# Patient Record
Sex: FEMALE | Race: WHITE | NOT HISPANIC OR LATINO | Employment: OTHER | URBAN - METROPOLITAN AREA
[De-identification: names, ages, dates, MRNs, and addresses within clinical notes are randomized per-mention and may not be internally consistent; named-entity substitution may affect disease eponyms.]

---

## 2023-05-29 PROBLEM — I10 HYPERTENSION: Status: ACTIVE | Noted: 2023-05-29

## 2023-05-29 PROBLEM — F41.9 ANXIETY: Status: ACTIVE | Noted: 2023-05-29

## 2023-05-29 PROBLEM — M47.816 LUMBAR SPONDYLOSIS: Status: ACTIVE | Noted: 2023-05-29

## 2023-05-29 PROBLEM — Z00.00 ROUTINE ADULT HEALTH MAINTENANCE: Status: ACTIVE | Noted: 2023-05-29

## 2023-05-29 PROBLEM — K57.30 DIVERTICULOSIS OF COLON: Status: ACTIVE | Noted: 2023-05-29

## 2023-05-29 PROBLEM — R03.0 ELEVATED BLOOD PRESSURE READING WITHOUT DIAGNOSIS OF HYPERTENSION: Status: ACTIVE | Noted: 2023-05-29

## 2023-05-29 PROBLEM — E55.9 VITAMIN D DEFICIENCY: Status: ACTIVE | Noted: 2018-07-17

## 2023-05-29 PROBLEM — M50.30 DEGENERATION OF INTERVERTEBRAL DISC OF CERVICAL REGION: Status: ACTIVE | Noted: 2023-05-29

## 2023-05-29 PROBLEM — R32 URINARY INCONTINENCE IN FEMALE: Status: ACTIVE | Noted: 2023-05-29

## 2023-05-29 PROBLEM — M19.90 ARTHRITIS: Status: ACTIVE | Noted: 2023-05-29

## 2023-05-29 PROBLEM — E78.2 HYPERLIPIDEMIA, MIXED: Status: ACTIVE | Noted: 2023-05-29

## 2023-05-29 PROBLEM — I49.3 MULTIPLE PREMATURE VENTRICULAR COMPLEXES: Status: ACTIVE | Noted: 2023-05-29

## 2023-05-29 PROBLEM — F32.0 CURRENT MILD EPISODE OF MAJOR DEPRESSIVE DISORDER WITHOUT PRIOR EPISODE (CMS-HCC): Status: ACTIVE | Noted: 2023-05-29

## 2023-05-29 PROBLEM — L50.9 URTICARIA: Status: ACTIVE | Noted: 2023-05-29

## 2023-05-29 PROBLEM — R00.2 PALPITATIONS: Status: ACTIVE | Noted: 2023-05-29

## 2023-05-29 PROBLEM — M47.22 CERVICAL SPONDYLOSIS WITH RADICULOPATHY: Status: ACTIVE | Noted: 2023-05-29

## 2023-06-22 ENCOUNTER — APPOINTMENT (OUTPATIENT)
Dept: PRIMARY CARE | Facility: CLINIC | Age: 63
End: 2023-06-22
Payer: COMMERCIAL

## 2023-06-22 RX ORDER — SERTRALINE HYDROCHLORIDE 50 MG/1
50 TABLET, FILM COATED ORAL DAILY
COMMUNITY
Start: 2020-02-11 | End: 2023-08-21 | Stop reason: SDUPTHER

## 2023-06-22 RX ORDER — BENZONATATE 200 MG/1
200 CAPSULE ORAL 3 TIMES DAILY PRN
COMMUNITY
Start: 2022-12-16 | End: 2023-12-28 | Stop reason: ALTCHOICE

## 2023-06-22 RX ORDER — CHOLECALCIFEROL (VITAMIN D3) 125 MCG
1 CAPSULE ORAL DAILY
COMMUNITY
Start: 2020-04-09

## 2023-06-22 RX ORDER — EVENING PRIMROSE OIL 500 MG
1 CAPSULE ORAL DAILY
COMMUNITY
Start: 2020-07-20

## 2023-06-22 RX ORDER — LANOLIN ALCOHOL/MO/W.PET/CERES
1 CREAM (GRAM) TOPICAL DAILY
COMMUNITY
Start: 2020-07-20

## 2023-06-22 RX ORDER — NAPROXEN SODIUM 550 MG/1
550 TABLET ORAL EVERY 12 HOURS PRN
COMMUNITY
Start: 2016-07-08 | End: 2023-12-28 | Stop reason: ALTCHOICE

## 2023-06-22 RX ORDER — FLUOCINONIDE 0.5 MG/G
1 CREAM TOPICAL 2 TIMES DAILY
COMMUNITY
Start: 2022-10-21 | End: 2023-12-28 | Stop reason: ALTCHOICE

## 2023-06-22 RX ORDER — AMLODIPINE BESYLATE 2.5 MG/1
1 TABLET ORAL DAILY
COMMUNITY
Start: 2022-07-15 | End: 2023-12-28 | Stop reason: ALTCHOICE

## 2023-08-04 ENCOUNTER — APPOINTMENT (OUTPATIENT)
Dept: PRIMARY CARE | Facility: CLINIC | Age: 63
End: 2023-08-04
Payer: COMMERCIAL

## 2023-08-21 DIAGNOSIS — F41.9 ANXIETY: ICD-10-CM

## 2023-08-21 RX ORDER — SERTRALINE HYDROCHLORIDE 50 MG/1
50 TABLET, FILM COATED ORAL DAILY
Qty: 90 TABLET | Refills: 3 | Status: SHIPPED | OUTPATIENT
Start: 2023-08-21

## 2023-12-28 ENCOUNTER — OFFICE VISIT (OUTPATIENT)
Dept: PRIMARY CARE | Facility: CLINIC | Age: 63
End: 2023-12-28
Payer: MEDICARE

## 2023-12-28 ENCOUNTER — TELEPHONE (OUTPATIENT)
Dept: PRIMARY CARE | Facility: CLINIC | Age: 63
End: 2023-12-28

## 2023-12-28 VITALS
HEART RATE: 82 BPM | BODY MASS INDEX: 25.71 KG/M2 | OXYGEN SATURATION: 96 % | HEIGHT: 66 IN | DIASTOLIC BLOOD PRESSURE: 67 MMHG | WEIGHT: 160 LBS | TEMPERATURE: 97.9 F | SYSTOLIC BLOOD PRESSURE: 104 MMHG

## 2023-12-28 DIAGNOSIS — J32.9 SINUSITIS, UNSPECIFIED CHRONICITY, UNSPECIFIED LOCATION: Primary | ICD-10-CM

## 2023-12-28 DIAGNOSIS — F41.9 ANXIETY: ICD-10-CM

## 2023-12-28 PROBLEM — T63.441A BEE STING REACTION: Status: ACTIVE | Noted: 2023-12-28

## 2023-12-28 PROBLEM — R93.89 ULTRASOUND SCAN ABNORMAL: Status: ACTIVE | Noted: 2023-12-28

## 2023-12-28 PROBLEM — J06.9 UPPER RESPIRATORY TRACT INFECTION: Status: RESOLVED | Noted: 2023-12-28 | Resolved: 2023-12-28

## 2023-12-28 PROBLEM — R51.9 HEADACHE: Status: ACTIVE | Noted: 2023-12-28

## 2023-12-28 PROBLEM — R26.2 DIFFICULTY WALKING: Status: ACTIVE | Noted: 2023-12-28

## 2023-12-28 PROBLEM — R10.9 LEFT FLANK PAIN: Status: ACTIVE | Noted: 2023-12-28

## 2023-12-28 PROBLEM — T63.441A BEE STING REACTION: Status: RESOLVED | Noted: 2023-12-28 | Resolved: 2023-12-28

## 2023-12-28 PROBLEM — R11.0 NAUSEA: Status: ACTIVE | Noted: 2023-12-28

## 2023-12-28 PROBLEM — R07.89 ATYPICAL CHEST PAIN: Status: ACTIVE | Noted: 2023-12-28

## 2023-12-28 PROBLEM — R09.89 RESPIRATORY SYMPTOMS: Status: RESOLVED | Noted: 2023-12-28 | Resolved: 2023-12-28

## 2023-12-28 PROBLEM — R09.89 RESPIRATORY SYMPTOMS: Status: ACTIVE | Noted: 2023-12-28

## 2023-12-28 PROBLEM — M54.9 COSTOVERTEBRAL ANGLE TENDERNESS: Status: ACTIVE | Noted: 2023-12-28

## 2023-12-28 PROBLEM — R07.81 RIB TENDERNESS: Status: ACTIVE | Noted: 2023-12-28

## 2023-12-28 PROBLEM — M54.6 THORACIC SPINE PAIN: Status: ACTIVE | Noted: 2023-12-28

## 2023-12-28 PROBLEM — J06.9 UPPER RESPIRATORY TRACT INFECTION: Status: ACTIVE | Noted: 2023-12-28

## 2023-12-28 LAB — POC RAPID STREP: NEGATIVE

## 2023-12-28 PROCEDURE — 1159F MED LIST DOCD IN RCRD: CPT | Performed by: NURSE PRACTITIONER

## 2023-12-28 PROCEDURE — 3078F DIAST BP <80 MM HG: CPT | Performed by: NURSE PRACTITIONER

## 2023-12-28 PROCEDURE — 87880 STREP A ASSAY W/OPTIC: CPT | Performed by: NURSE PRACTITIONER

## 2023-12-28 PROCEDURE — 1160F RVW MEDS BY RX/DR IN RCRD: CPT | Performed by: NURSE PRACTITIONER

## 2023-12-28 PROCEDURE — 1036F TOBACCO NON-USER: CPT | Performed by: NURSE PRACTITIONER

## 2023-12-28 PROCEDURE — 3074F SYST BP LT 130 MM HG: CPT | Performed by: NURSE PRACTITIONER

## 2023-12-28 PROCEDURE — 99213 OFFICE O/P EST LOW 20 MIN: CPT | Performed by: NURSE PRACTITIONER

## 2023-12-28 RX ORDER — ALBUTEROL SULFATE 90 UG/1
2 AEROSOL, METERED RESPIRATORY (INHALATION) EVERY 4 HOURS PRN
Qty: 18 G | Refills: 2 | Status: SHIPPED | OUTPATIENT
Start: 2023-12-28 | End: 2024-12-27

## 2023-12-28 RX ORDER — BENZONATATE 200 MG/1
200 CAPSULE ORAL 3 TIMES DAILY PRN
Qty: 30 CAPSULE | Refills: 0 | Status: SHIPPED | OUTPATIENT
Start: 2023-12-28 | End: 2024-01-08 | Stop reason: WASHOUT

## 2023-12-28 RX ORDER — AMOXICILLIN AND CLAVULANATE POTASSIUM 875; 125 MG/1; MG/1
875 TABLET, FILM COATED ORAL 2 TIMES DAILY
Qty: 14 TABLET | Refills: 0 | Status: SHIPPED | OUTPATIENT
Start: 2023-12-28 | End: 2024-01-04

## 2023-12-28 ASSESSMENT — PATIENT HEALTH QUESTIONNAIRE - PHQ9
SUM OF ALL RESPONSES TO PHQ QUESTIONS 1-9: 8
9. THOUGHTS THAT YOU WOULD BE BETTER OFF DEAD, OR OF HURTING YOURSELF: NOT AT ALL
10. IF YOU CHECKED OFF ANY PROBLEMS, HOW DIFFICULT HAVE THESE PROBLEMS MADE IT FOR YOU TO DO YOUR WORK, TAKE CARE OF THINGS AT HOME, OR GET ALONG WITH OTHER PEOPLE: SOMEWHAT DIFFICULT
4. FEELING TIRED OR HAVING LITTLE ENERGY: NEARLY EVERY DAY
1. LITTLE INTEREST OR PLEASURE IN DOING THINGS: SEVERAL DAYS
3. TROUBLE FALLING OR STAYING ASLEEP OR SLEEPING TOO MUCH: SEVERAL DAYS
2. FEELING DOWN, DEPRESSED OR HOPELESS: NEARLY EVERY DAY
7. TROUBLE CONCENTRATING ON THINGS, SUCH AS READING THE NEWSPAPER OR WATCHING TELEVISION: NOT AT ALL
6. FEELING BAD ABOUT YOURSELF - OR THAT YOU ARE A FAILURE OR HAVE LET YOURSELF OR YOUR FAMILY DOWN: NOT AT ALL
8. MOVING OR SPEAKING SO SLOWLY THAT OTHER PEOPLE COULD HAVE NOTICED. OR THE OPPOSITE, BEING SO FIGETY OR RESTLESS THAT YOU HAVE BEEN MOVING AROUND A LOT MORE THAN USUAL: NOT AT ALL
SUM OF ALL RESPONSES TO PHQ9 QUESTIONS 1 AND 2: 4
5. POOR APPETITE OR OVEREATING: NOT AT ALL

## 2023-12-28 NOTE — PROGRESS NOTES
"Problem List Items Addressed This Visit       Anxiety    Overview     12/28/23: rec restart zoloft 25 mg every day and can titrate to 50 mg every day . Monitor           Other Visit Diagnoses       Sinusitis, unspecified chronicity, unspecified location    -  Primary    exposed to strep  rapid neg  given chronicity start augmentin now  alb prn  symptomatic care  fu prn    Relevant Medications    benzonatate (Tessalon) 200 mg capsule    amoxicillin-pot clavulanate (Augmentin) 875-125 mg tablet    albuterol 90 mcg/actuation inhaler    Other Relevant Orders    POCT Rapid Strep A manually resulted (Completed)             Subjective   Patient ID: Krystal Angulo is a 63 y.o. female who presents for Cough (Cough x 1 month/Nasal congestion/drainage for a few weeks/No fever/Tested covid 2 weeks ago negative/Stopped zoloft a few weeks ago would like to ask you a few questions about symptoms she had while taking).  HPI  URI  At night she hears crackle out of her mouth  Gets rash on her back  Throat bothering her from cough & PND  - prasanna in morning  Frontal HA    Anxiety  Stopped zoloft couple weeks ago  Forgets to take it    Review of Systems   All other systems reviewed and are negative.      BP Readings from Last 3 Encounters:   12/28/23 104/67   12/16/22 125/79   11/15/22 116/68      Wt Readings from Last 3 Encounters:   12/28/23 72.6 kg (160 lb)   12/16/22 75.3 kg (166 lb)   11/15/22 77.1 kg (170 lb)      BMI:   Estimated body mass index is 25.82 kg/m² as calculated from the following:    Height as of this encounter: 1.676 m (5' 6\").    Weight as of this encounter: 72.6 kg (160 lb).    Objective   Physical Exam  Constitutional:       General: She is not in acute distress.  HENT:      Head: Normocephalic and atraumatic.      Right Ear: Tympanic membrane and external ear normal.      Left Ear: Tympanic membrane and external ear normal.      Nose: Nose normal.      Mouth/Throat:      Mouth: Mucous membranes are moist.      " Pharynx: Posterior oropharyngeal erythema present.      Comments: Tonsils 1+ bilat with small white spot   Eyes:      Extraocular Movements: Extraocular movements intact.      Conjunctiva/sclera: Conjunctivae normal.   Cardiovascular:      Rate and Rhythm: Normal rate and regular rhythm.      Pulses: Normal pulses.   Pulmonary:      Effort: Pulmonary effort is normal.      Breath sounds: Normal breath sounds.   Abdominal:      General: Bowel sounds are normal.      Palpations: Abdomen is soft.   Musculoskeletal:         General: Normal range of motion.      Cervical back: Normal range of motion and neck supple.   Skin:     General: Skin is warm and dry.   Neurological:      General: No focal deficit present.      Mental Status: She is alert.   Psychiatric:         Mood and Affect: Mood normal.

## 2023-12-28 NOTE — TELEPHONE ENCOUNTER
Patient called to schedule MW. She canceled or no show all 3 apts she had made for MW. I let patient know your next available is 3/19. Patient does not want to wait until then and requested if she can see DG. Please advise

## 2024-01-08 ENCOUNTER — ANCILLARY PROCEDURE (OUTPATIENT)
Dept: RADIOLOGY | Facility: CLINIC | Age: 64
End: 2024-01-08
Payer: MEDICARE

## 2024-01-08 ENCOUNTER — OFFICE VISIT (OUTPATIENT)
Dept: PRIMARY CARE | Facility: CLINIC | Age: 64
End: 2024-01-08
Payer: MEDICARE

## 2024-01-08 VITALS
OXYGEN SATURATION: 98 % | BODY MASS INDEX: 26.24 KG/M2 | DIASTOLIC BLOOD PRESSURE: 69 MMHG | TEMPERATURE: 98.3 F | HEART RATE: 70 BPM | SYSTOLIC BLOOD PRESSURE: 107 MMHG | WEIGHT: 162.6 LBS

## 2024-01-08 DIAGNOSIS — R07.81 RIB PAIN: Primary | ICD-10-CM

## 2024-01-08 DIAGNOSIS — F32.0 CURRENT MILD EPISODE OF MAJOR DEPRESSIVE DISORDER WITHOUT PRIOR EPISODE (CMS-HCC): ICD-10-CM

## 2024-01-08 DIAGNOSIS — R07.81 RIB PAIN: ICD-10-CM

## 2024-01-08 PROBLEM — M54.9 COSTOVERTEBRAL ANGLE TENDERNESS: Status: RESOLVED | Noted: 2023-12-28 | Resolved: 2024-01-08

## 2024-01-08 PROCEDURE — 71111 X-RAY EXAM RIBS/CHEST4/> VWS: CPT | Mod: FOREIGN READ | Performed by: RADIOLOGY

## 2024-01-08 PROCEDURE — 1160F RVW MEDS BY RX/DR IN RCRD: CPT | Performed by: NURSE PRACTITIONER

## 2024-01-08 PROCEDURE — 1036F TOBACCO NON-USER: CPT | Performed by: NURSE PRACTITIONER

## 2024-01-08 PROCEDURE — 1159F MED LIST DOCD IN RCRD: CPT | Performed by: NURSE PRACTITIONER

## 2024-01-08 PROCEDURE — 71110 X-RAY EXAM RIBS BIL 3 VIEWS: CPT

## 2024-01-08 PROCEDURE — 3078F DIAST BP <80 MM HG: CPT | Performed by: NURSE PRACTITIONER

## 2024-01-08 PROCEDURE — 3074F SYST BP LT 130 MM HG: CPT | Performed by: NURSE PRACTITIONER

## 2024-01-08 PROCEDURE — 99213 OFFICE O/P EST LOW 20 MIN: CPT | Performed by: NURSE PRACTITIONER

## 2024-01-08 ASSESSMENT — PATIENT HEALTH QUESTIONNAIRE - PHQ9
2. FEELING DOWN, DEPRESSED OR HOPELESS: NOT AT ALL
1. LITTLE INTEREST OR PLEASURE IN DOING THINGS: NOT AT ALL
SUM OF ALL RESPONSES TO PHQ9 QUESTIONS 1 AND 2: 0

## 2024-01-08 NOTE — PROGRESS NOTES
"Problem List Items Addressed This Visit       Current mild episode of major depressive disorder without prior episode (CMS/HCC)    Overview     sx well managed.  no AE or SE.  continue current dose zoloft           Other Visit Diagnoses       Rib pain    -  Primary    posterior, bilat thoracic ribs tender with paraspinal muscle tenderness  given recent resp illness with cough will r/o fracture  declines muscle relaxant    Relevant Orders    XR ribs 4 views bilateral             Subjective   Patient ID: Krystal Angulo is a 63 y.o. female who presents for Sick Visit (Grabbing pain in ribs . Was in last week for sick visit. Hurts to the touch).  HPI  I saw her on 12/28  Dx sinusitis  Augmentin completed  Sinus sx improved  Occ cough in the morning    Bilat flank pain   Feels muscular  Exacerbated by: hip rotation, sitting up  Relieved by: cool, bracing with pillow  R > L  \"Grabs\"  Takes her breath away  Comes and goes  No relief tylenol, motrin    2019 R rib/chest:  No visible acute right rib abnormality. No acute cardiopulmonary  process.    No fever  No UTI sx    Review of Systems   All other systems reviewed and are negative.      BP Readings from Last 3 Encounters:   01/08/24 107/69   12/28/23 104/67   12/16/22 125/79      Wt Readings from Last 3 Encounters:   01/08/24 73.8 kg (162 lb 9.6 oz)   12/28/23 72.6 kg (160 lb)   12/16/22 75.3 kg (166 lb)      BMI:   Estimated body mass index is 26.24 kg/m² as calculated from the following:    Height as of 12/28/23: 1.676 m (5' 6\").    Weight as of this encounter: 73.8 kg (162 lb 9.6 oz).    Objective   Physical Exam  Constitutional:       General: She is not in acute distress.  HENT:      Head: Normocephalic and atraumatic.      Right Ear: Tympanic membrane and external ear normal.      Left Ear: Tympanic membrane and external ear normal.      Nose: Nose normal.      Mouth/Throat:      Mouth: Mucous membranes are moist.   Eyes:      Extraocular Movements: Extraocular " movements intact.      Conjunctiva/sclera: Conjunctivae normal.   Cardiovascular:      Rate and Rhythm: Normal rate and regular rhythm.      Pulses: Normal pulses.   Pulmonary:      Effort: Pulmonary effort is normal.      Breath sounds: Normal breath sounds.   Abdominal:      General: Bowel sounds are normal.      Palpations: Abdomen is soft.   Musculoskeletal:      Cervical back: Normal range of motion and neck supple.      Comments: Entire spine is non tender. T-spine bilat paraspinal muscles and ribs are tender with deep palpation    Skin:     General: Skin is warm and dry.   Neurological:      General: No focal deficit present.      Mental Status: She is alert.   Psychiatric:         Mood and Affect: Mood normal.

## 2024-01-09 RX ORDER — TIZANIDINE 4 MG/1
4 TABLET ORAL EVERY 8 HOURS PRN
Qty: 30 TABLET | Refills: 0 | Status: SHIPPED | OUTPATIENT
Start: 2024-01-09 | End: 2024-01-18 | Stop reason: SDUPTHER

## 2024-01-18 ENCOUNTER — OFFICE VISIT (OUTPATIENT)
Dept: PRIMARY CARE | Facility: CLINIC | Age: 64
End: 2024-01-18
Payer: MEDICARE

## 2024-01-18 VITALS
HEART RATE: 74 BPM | WEIGHT: 161 LBS | DIASTOLIC BLOOD PRESSURE: 88 MMHG | TEMPERATURE: 97.7 F | HEIGHT: 66 IN | SYSTOLIC BLOOD PRESSURE: 138 MMHG | BODY MASS INDEX: 25.88 KG/M2 | OXYGEN SATURATION: 95 %

## 2024-01-18 DIAGNOSIS — I10 PRIMARY HYPERTENSION: ICD-10-CM

## 2024-01-18 DIAGNOSIS — Z12.11 ENCOUNTER FOR SCREENING FOR MALIGNANT NEOPLASM OF COLON: ICD-10-CM

## 2024-01-18 DIAGNOSIS — Z13.820 SCREENING FOR OSTEOPOROSIS: ICD-10-CM

## 2024-01-18 DIAGNOSIS — Z12.31 ENCOUNTER FOR SCREENING MAMMOGRAM FOR MALIGNANT NEOPLASM OF BREAST: ICD-10-CM

## 2024-01-18 DIAGNOSIS — Z00.00 ROUTINE GENERAL MEDICAL EXAMINATION AT HEALTH CARE FACILITY: ICD-10-CM

## 2024-01-18 DIAGNOSIS — Z78.0 MENOPAUSE: ICD-10-CM

## 2024-01-18 DIAGNOSIS — R23.9 SKIN CHANGE: ICD-10-CM

## 2024-01-18 DIAGNOSIS — E78.2 HYPERLIPIDEMIA, MIXED: ICD-10-CM

## 2024-01-18 DIAGNOSIS — F41.9 ANXIETY: ICD-10-CM

## 2024-01-18 DIAGNOSIS — Z12.4 SCREENING FOR CERVICAL CANCER: ICD-10-CM

## 2024-01-18 DIAGNOSIS — E03.9 HYPOTHYROIDISM, UNSPECIFIED TYPE: ICD-10-CM

## 2024-01-18 DIAGNOSIS — R07.81 RIB PAIN: ICD-10-CM

## 2024-01-18 DIAGNOSIS — Z00.00 INITIAL MEDICARE ANNUAL WELLNESS VISIT: Primary | ICD-10-CM

## 2024-01-18 DIAGNOSIS — Z71.89 CARDIAC RISK COUNSELING: ICD-10-CM

## 2024-01-18 DIAGNOSIS — Z00.00 ROUTINE ADULT HEALTH MAINTENANCE: ICD-10-CM

## 2024-01-18 DIAGNOSIS — E55.9 VITAMIN D DEFICIENCY: ICD-10-CM

## 2024-01-18 DIAGNOSIS — Z71.89 ADVANCED CARE PLANNING/COUNSELING DISCUSSION: ICD-10-CM

## 2024-01-18 LAB — POC FECAL OCCULT BLOOD: NEGATIVE

## 2024-01-18 PROCEDURE — 3075F SYST BP GE 130 - 139MM HG: CPT | Performed by: NURSE PRACTITIONER

## 2024-01-18 PROCEDURE — 82270 OCCULT BLOOD FECES: CPT | Performed by: NURSE PRACTITIONER

## 2024-01-18 PROCEDURE — 99213 OFFICE O/P EST LOW 20 MIN: CPT | Performed by: NURSE PRACTITIONER

## 2024-01-18 PROCEDURE — 87624 HPV HI-RISK TYP POOLED RSLT: CPT

## 2024-01-18 PROCEDURE — G0101 CA SCREEN;PELVIC/BREAST EXAM: HCPCS | Performed by: NURSE PRACTITIONER

## 2024-01-18 PROCEDURE — 1160F RVW MEDS BY RX/DR IN RCRD: CPT | Performed by: NURSE PRACTITIONER

## 2024-01-18 PROCEDURE — 88175 CYTOPATH C/V AUTO FLUID REDO: CPT

## 2024-01-18 PROCEDURE — 1159F MED LIST DOCD IN RCRD: CPT | Performed by: NURSE PRACTITIONER

## 2024-01-18 PROCEDURE — G0403 EKG FOR INITIAL PREVENT EXAM: HCPCS | Performed by: NURSE PRACTITIONER

## 2024-01-18 PROCEDURE — 1036F TOBACCO NON-USER: CPT | Performed by: NURSE PRACTITIONER

## 2024-01-18 PROCEDURE — G0402 INITIAL PREVENTIVE EXAM: HCPCS | Performed by: NURSE PRACTITIONER

## 2024-01-18 PROCEDURE — 3008F BODY MASS INDEX DOCD: CPT | Performed by: NURSE PRACTITIONER

## 2024-01-18 PROCEDURE — 3079F DIAST BP 80-89 MM HG: CPT | Performed by: NURSE PRACTITIONER

## 2024-01-18 RX ORDER — TIZANIDINE 4 MG/1
4 TABLET ORAL EVERY 8 HOURS PRN
Qty: 30 TABLET | Refills: 0 | Status: SHIPPED | OUTPATIENT
Start: 2024-01-18 | End: 2024-01-26 | Stop reason: SDUPTHER

## 2024-01-18 NOTE — ASSESSMENT & PLAN NOTE
Welcome to Medicare/Wellness exam completed   Preventive Health history reviewed:  Vaccines today: none  Labs ordered    Mamm ordered  Cscope ordered  DEXA ordered   Depression Screening discussed   Advanced Directives Discussion Completed  Cardiovascular risk discussed and if needed, lifestyle modifications recommended, including nutritional choices, exercise, and elimination of habits contributing to risk.  We agreed on a plan to reduce the current cardiovascular risk.  See ecalc ASCVD Risk  Plus for data discussed regarding risk and risk reduction opportunities.  Aspirin use/disuse was discussed after reviewing the updated guidelines.

## 2024-01-18 NOTE — PROGRESS NOTES
Problem List Items Addressed This Visit       Advanced care planning/counseling discussion    Overview     Intending for daughter, Shannon, to be her POA pending final discussion/decision  Full code          Anxiety    Overview     12/28/23: rec restart zoloft 25 mg every day and can titrate to 50 mg every day . Monitor   1/18/24: stable on zoloft          BMI 25.0-25.9,adult    Overview     Continue healthy nutrition and exercise as tolerated          Cardiac risk counseling    Overview     1/2024 ASCVD risk 6.1% borderline risk   ASA not indicated with present guidelines          Encounter for screening for malignant neoplasm of colon    Overview     2012 cscope (10 y)         Current Assessment & Plan     Schedule routine cscope          Relevant Orders    Colonoscopy Screening; Average Risk Patient    Encounter for screening mammogram for malignant neoplasm of breast    Overview     Mamm ordered          Relevant Orders    BI mammo bilateral screening tomosynthesis    Hyperlipidemia, mixed    Overview     Comment on above: Goal LDl <130;         Relevant Orders    Lipid Panel    Hypertension    Overview     7/15/22: hers 127/74 hr 75 ours 121/74 hr 76  S/p norvasc  Has seen Dr Flannery   Currently controlled without antihypertensive. Will continue to monitor  1/18/24: IO EKG done         Relevant Orders    ECG 12 lead (Clinic Performed) (Completed)    CBC and Auto Differential    Comprehensive Metabolic Panel    TSH with reflex to Free T4 if abnormal    Urinalysis with Reflex Culture and Microscopic    Hypothyroidism    Current Assessment & Plan     Check levels  No tx at this time         Relevant Orders    TSH with reflex to Free T4 if abnormal    Menopause    Relevant Orders    XR DEXA bone density    Routine adult health maintenance    Overview     Influenza Vaccine, Split-Non Spec10/22/18   Pfizer COVID 19 vaccine 3/25/21, 4/20/21, 12/24/21   TD Adult2/21/2005   Tdap (Age 7+)6/10/2011; 10/2022  PAP/HPV 2013  (-); 10/22/18 (-)  Cscope 2012 (10yrs)  Mamm 9/26/18  Occult stool 1/2024 (-)         Current Assessment & Plan     Welcome to Medicare/Wellness exam completed   Preventive Health history reviewed:  Vaccines today: none  Labs ordered    Mamm ordered  Cscope ordered  DEXA ordered   Depression Screening discussed   Advanced Directives Discussion Completed  Cardiovascular risk discussed and if needed, lifestyle modifications recommended, including nutritional choices, exercise, and elimination of habits contributing to risk.  We agreed on a plan to reduce the current cardiovascular risk.  See ecalc ASCVD Risk  Plus for data discussed regarding risk and risk reduction opportunities.  Aspirin use/disuse was discussed after reviewing the updated guidelines.          Relevant Orders    Visual acuity screening (Completed)    Vitamin B12    POCT Fecal occult blood-guiac methodology screening manually resulted (Completed)    Screening for cervical cancer    Current Assessment & Plan     Pap/hpv sent         Relevant Orders    THINPREP PAP TEST    Screening for osteoporosis    Current Assessment & Plan     Baseline DEXA ordered          Relevant Orders    XR DEXA bone density    Vitamin D deficiency    Overview     8/8/22: 22Goal ~50;         Current Assessment & Plan     Check level on supp         Relevant Orders    Vitamin D 25-Hydroxy,Total (for eval of Vitamin D levels)     Other Visit Diagnoses       Initial Medicare annual wellness visit    -  Primary    Skin change        schedule derm    Relevant Orders    Referral to Dermatology    Routine general medical examination at health care facility                 Chief Complaint:   Welcome to Medicare/Comprehensive Problem Focused Follow Up and Physical Exam    HPI:  Due for health maint screenings    Anxiety  Stable with zoloft    Otherwise doing well     Patient Care Team:  Kimberly Charles MD as PCP - General (Internal Medicine)  Tigre Flannery MD as Consulting  Physician (Cardiology)   Active Problem List  Patient Active Problem List   Diagnosis    Routine adult health maintenance    BMI 25.0-25.9,adult    Anxiety    Arthritis    Cervical spondylosis with radiculopathy    Closed fracture of metatarsal bone    Current mild episode of major depressive disorder without prior episode (CMS/HCC)    Diverticulosis of colon    Facet syndrome, lumbar    Hyperlipidemia, mixed    Hypertension    Hypothyroidism    Degeneration of intervertebral disc of cervical region    Degeneration of intervertebral disc of lumbar region    Lumbar spondylosis    Multiple premature ventricular complexes    Palpitations    AMIRAH (stress urinary incontinence, female)    Urinary incontinence in female    Urticaria    Vitamin D deficiency    Atypical chest pain    Chronic low back pain    Depression    Difficulty walking    Ganglion of joint    Headache    Hot flash, menopausal    Left flank pain    Lumbar disc herniation    Nausea    Rib tenderness    Thoracic spine pain    Ulnar neuropathy at elbow    Ultrasound scan abnormal    Advanced care planning/counseling discussion    Cardiac risk counseling    Screening for osteoporosis    Menopause    Encounter for screening mammogram for malignant neoplasm of breast    Encounter for screening for malignant neoplasm of colon    Screening for cervical cancer         Comprehensive Medical/Surgical/Social/Family History  Past Medical History:   Diagnosis Date    H/O cardiovascular stress test 08/07/2020    3/13: The exercise stress echo was negative for ischemia at 100 % of MPHR (11.1 METS). Good functional capacity for age and gender. - The left ventricle is normal in size. Left ventricular systolic function is normal. EF = 66  5% (2D biplane)    H/O chest x-ray     7/22: normal 3/18 (-) 5/19: ribs & chest neg 2/18/16 (-)    H/O chest x-ray 01/08/2024    bilat ribs & visualized chest normal    H/O CT scan of brain     7/22: normal    H/O diagnostic ultrasound      7/20: Renal US (-)    H/O echocardiogram     8/12/22: 1. The left ventricular systolic function is normal with a 60-65% estimated ejection fraction. 2. RVSP within normal limits.    H/O magnetic resonance imaging of cervical spine     2010: disc bulging with spurring C4-5-6, C6-7    H/O magnetic resonance imaging of lumbar spine     010: L2 disc herniation, bulging discs L3, L4 8/20: Multilevel spondylosis. Disc osteophyte complex with superimposed laeft paracentral herniation at L4-5 causeing severe left foraminal, moderate centra canal and right neural foraminal stenosis. Disc osteophyte complex at L3-4 and L5-S1 causing mild to moderate neural foraminal and central canal stenosis. Disc bulge at L1-3    H/O pelvic ultrasound     2011; Dr Gacría; intramural fibroid    H/O pelvic ultrasound     2011; fibroid    History of Holter monitoring     8/22:  HR , avg 82. SVE burden 0.03%, max count per 24 hours= 30 PVC burden 0.03%, max count per 24hours =39, 2 disparate morphologies SVT: 9 events, longest 28 beats, fastest event 116 BPM VT: 1 event, 3 beats, 169BPM Pt recorded 9 events 7/22: 1. The basic rhythm was sinus, ranging from 54 to 152 beats per minute, with an average of 87 beats per minute.  2. Rare isolated PVCs/PACs     Past Surgical History:   Procedure Laterality Date    CARPAL TUNNEL RELEASE  09/17/2018    Neuroplasty Decompression Median Nerve At Carpal Tunnel    COLONOSCOPY  10/22/2018    2012: Multiple large mouth diverticuli in sigmoid    DILATION AND CURETTAGE OF UTERUS  09/17/2018    Dilation And Curettage    LAPAROSCOPY DIAGNOSTIC / BIOPSY / ASPIRATION / LYSIS  09/17/2018    Laparoscopy (Diagnostic)    VARICOSE VEIN SURGERY  09/17/2018    Varicose Vein Ligation     Social History     Social History Narrative    3 kids         Doesnt work presently    Nonsmoker    Social  ETOH    occasional marijuana use    ---    Family History:    F: HTN, CAD/MI in 50's, Prostate CA, NHL, Lung CA  ( age 90)    M:  Arthritis /DJD, TKA bilaterally, Depression, back issues, Alzheimers Dementia, AFIB;(  age 89)    Son:  Anxiety/Depression (Manish)    B:  Hypertension                                                 S:  Anxiety/ BRYAN    PGF:  Stroke                                           MGM:  Diabetes    PGM: Thyroid CA                                        D: Shannon Kaminski     Tobacco/Alcohol/Opioid use, as well as Illicit Drug Use was screened for/reviewed and documented in Social Documentation section of the chart and medication list as appropriate    Allergies and Medications  Aspirin, Azithromycin, Buspirone, Darvocet a500 [propoxyphene n-acetaminophen], Paroxetine, and Propoxyphene  Current Outpatient Medications   Medication Instructions    albuterol 90 mcg/actuation inhaler 2 puffs, inhalation, Every 4 hours PRN    cholecalciferol (Vitamin D-3) 125 MCG (5000 UT) capsule 1 capsule, oral, Daily    cyanocobalamin (Vitamin B-12) 1,000 mcg tablet 1 tablet, oral, Daily    sertraline (ZOLOFT) 50 mg, oral, Daily    tiZANidine (ZANAFLEX) 4 mg, oral, Every 8 hours PRN    vitamin E 45 mg (100 unit) capsule 1 capsule, oral, Daily     Medications and Supplements  prescribed by me and other practitioners or clinical pharmacist (such as prescriptions, OTC's, herbal therapies and supplements) were reviewed and documented in the medical record.      Activities of Daily Living  In your present state of health, do you have any difficulty performing the following activities?:   Preparing food and eating?: No  Bathing yourself: No  Getting dressed: No  Using the toilet:No  Moving around from place to place: No  In the past year have you fallen or had a near fall?:No  Able to manage finances independently: Yes  Able to perform grocery shopping: Yes  Able to manage medications independently: Yes  Able to do housework independently: Yes  Patient self-assessment of health status? Good    Depression Screen  (Note: if  "answer to either of the following is \"Yes\", then a more complete depression screening is indicated)   Q1: Over the past two weeks, have you felt down, depressed or hopeless?  Pt declined   Q2: Over the past two weeks, have you felt little interest or pleasure in doing things?  Pt declined     Current exercise habits: None    Dietary issues discussed: Yes  Hearing difficulties: No  Safe in current home environment: Yes  Visual Acuity assessed: No  Cognitive Impairment No    Advance directives  Advanced Care Planning (including a Living Will, Healthcare POA, as well as specific end of life choices and/or directives), was discussed with the patient and/or surrogate, voluntarily, and documented in the Problem List of the medical record.     Cardiac Risk Assessment  Cardiovascular risk was discussed and, if needed, lifestyle modifications recommended, including nutritional choices, exercise, and elimination of habits contributing to risk. We agreed on a plan to reduce the current cardiovascular risk based on above discussion as needed.  Aspirin use/disuse was discussed and documented in the Problem List of the medical record after reviewing the updated guidelines below:    Consider low dose Aspirin ( mg) use if the benefit for cardiovascular disease prevention outweighs risk for bleeding complications.   In general, low dose ASA should be considered:  In patients WITHOUT prior MI/stroke/PAD (primary prevention):   a. Age <60: Use if 10-year cardiovascular disease risk >20%, with discussion of risks and benefits with patient  b. Age 60-<70: Use if 10-year cardiovascular disease risk >20% and low bleeding (e.g., gastrointenstinal) risk, with discussion of risks and benefits with patient  c. Age >=70: Do not use    In patients WITH prior MI/stroke/PAD (secondary prevention):   Generally use unless extremely high bleeding (e.g., gastrointenstinal) risk, with discussion of risks and benefits with patient    ROS " "otherwise negative aside from what was mentioned above in HPI.    Gen:  no fever  HEENT:  no trouble swallowing  CV:  no dyspnea, cyanosis  Lungs:  no shortness of breath  GI:  no constipation, no blood in stool  Vascular:  no edema  Neuro:   no weakness  Skin:  no rash  MS:no joint swelling  Gu:  no urinary complaints  All other systems have been reviewed and are negative for complaint    Vitals  /88   Pulse 74   Temp 36.5 °C (97.7 °F) (Temporal)   Ht 1.676 m (5' 6\")   Wt 73 kg (161 lb)   SpO2 95%   BMI 25.99 kg/m²   Body mass index is 25.99 kg/m².  Objective   Physical Exam  General Appearance:  Alert and oriented.  NAD  HEENT:  Tm's normal , throat clear, no erythema  Lungs, CTAB  Skin:  no suspicious lesions,  warm and dry  Head :  Normocephalic  Oral Cavity; Clear mucosa moist  Neck/thyroid:  neck supple, full rom, no cervical lymphadenopathy  no thyromegaly  Heart:  RRR  no murmurs  Abdomen:  Normal , bs present, soft, nontender, not distended, no masses palpated  Extremities:  No clubbing, cyanosis, or edema  Neurologic:  Nonfocal  Psych: alert, normal mood    During the course of the visit the patient was educated and counseled about age appropriate screening and preventive services. Completed preventive screenings were documented in the chart and orders were placed for outstanding screenings/procedures as documented in the Assessment and Plan.    Patient Instructions (the written plan) was given to the patient at check out.    Krystal was seen today for Welcome to Medicare and annual comprehensive physical exam.     Chronic conditions reviewed in the assessment and plan.    Continue medications unless specified otherwise.  Previous labs reviewed.   Other specialty provider notes reviewed.       Welcome to Medicare/Wellness exam completed   Preventive Health history reviewed:  Vaccines today: none  Labs ordered    Depression Screening discussed   Advanced Directives Discussion " "Completed  Cardiovascular risk discussed and if needed, lifestyle modifications recommended, including nutritional choices, exercise, and elimination of habits contributing to risk.  We agreed on a plan to reduce the current cardiovascular risk.  See ecalc ASCVD Risk  Plus for data discussed regarding risk and risk reduction opportunities.  Aspirin use/disuse was discussed after reviewing the updated guidelines.    Subjective   Reason for Visit: Krystal Angulo is an 63 y.o. female here for a Medicare Wellness visit.     Past Medical, Surgical, and Family History reviewed and updated in chart.    Reviewed all medications by prescribing practitioner or clinical pharmacist (such as prescriptions, OTCs, herbal therapies and supplements) and documented in the medical record.    HPI    Patient Care Team:  Kimberly Charles MD as PCP - General (Internal Medicine)  Tigre Flannery MD as Consulting Physician (Cardiology)     Review of Systems    Objective   Vitals:  /88   Pulse 74   Temp 36.5 °C (97.7 °F) (Temporal)   Ht 1.676 m (5' 6\")   Wt 73 kg (161 lb)   SpO2 95%   BMI 25.99 kg/m²       Physical Exam    Assessment/Plan   Problem List Items Addressed This Visit       Advanced care planning/counseling discussion    Overview     Intending for daughter, Shannon, to be her POA pending final discussion/decision  Full code          Anxiety    Overview     12/28/23: rec restart zoloft 25 mg every day and can titrate to 50 mg every day . Monitor   1/18/24: stable on zoloft          BMI 25.0-25.9,adult    Overview     Continue healthy nutrition and exercise as tolerated          Cardiac risk counseling    Overview     1/2024 ASCVD risk 6.1% borderline risk   ASA not indicated with present guidelines          Encounter for screening for malignant neoplasm of colon    Overview     2012 cscope (8 y)         Current Assessment & Plan     Schedule routine cscope          Relevant Orders    Colonoscopy " Screening; Average Risk Patient    Encounter for screening mammogram for malignant neoplasm of breast    Overview     Mamm ordered          Relevant Orders    BI mammo bilateral screening tomosynthesis    Hyperlipidemia, mixed    Overview     Comment on above: Goal LDl <130;         Relevant Orders    Lipid Panel    Hypertension    Overview     7/15/22: hers 127/74 hr 75 ours 121/74 hr 76  S/p norvasc  Has seen Dr Flannery   Currently controlled without antihypertensive. Will continue to monitor  1/18/24: IO EKG done         Relevant Orders    ECG 12 lead (Clinic Performed) (Completed)    CBC and Auto Differential    Comprehensive Metabolic Panel    TSH with reflex to Free T4 if abnormal    Urinalysis with Reflex Culture and Microscopic    Hypothyroidism    Current Assessment & Plan     Check levels  No tx at this time         Relevant Orders    TSH with reflex to Free T4 if abnormal    Menopause    Relevant Orders    XR DEXA bone density    Routine adult health maintenance    Overview     Influenza Vaccine, Split-Non Spec10/22/18   Pfizer COVID 19 vaccine 3/25/21, 4/20/21, 12/24/21   TD Adult2/21/2005   Tdap (Age 7+)6/10/2011; 10/2022  PAP/HPV 2013 (-); 10/22/18 (-)  Cscope 2012 (10yrs)  Mamm 9/26/18  Occult stool 1/2024 (-)         Current Assessment & Plan     Welcome to Medicare/Wellness exam completed   Preventive Health history reviewed:  Vaccines today: none  Labs ordered    Mamm ordered  Cscope ordered  DEXA ordered   Depression Screening discussed   Advanced Directives Discussion Completed  Cardiovascular risk discussed and if needed, lifestyle modifications recommended, including nutritional choices, exercise, and elimination of habits contributing to risk.  We agreed on a plan to reduce the current cardiovascular risk.  See ecalc ASCVD Risk  Plus for data discussed regarding risk and risk reduction opportunities.  Aspirin use/disuse was discussed after reviewing the updated guidelines.           Relevant Orders    Visual acuity screening (Completed)    Vitamin B12    POCT Fecal occult blood-guiac methodology screening manually resulted (Completed)    Screening for cervical cancer    Current Assessment & Plan     Pap/hpv sent         Relevant Orders    THINPREP PAP TEST    Screening for osteoporosis    Current Assessment & Plan     Baseline DEXA ordered          Relevant Orders    XR DEXA bone density    Vitamin D deficiency    Overview     8/8/22: 22Goal ~50;         Current Assessment & Plan     Check level on supp         Relevant Orders    Vitamin D 25-Hydroxy,Total (for eval of Vitamin D levels)     Other Visit Diagnoses       Initial Medicare annual wellness visit    -  Primary    Skin change        schedule derm    Relevant Orders    Referral to Dermatology    Routine general medical examination at health care facility

## 2024-01-26 DIAGNOSIS — R07.81 RIB PAIN: ICD-10-CM

## 2024-01-26 RX ORDER — TIZANIDINE 4 MG/1
4 TABLET ORAL EVERY 8 HOURS PRN
Qty: 30 TABLET | Refills: 0 | Status: SHIPPED | OUTPATIENT
Start: 2024-01-26 | End: 2024-02-02 | Stop reason: SDUPTHER

## 2024-02-02 DIAGNOSIS — R07.81 RIB PAIN: ICD-10-CM

## 2024-02-02 LAB
CYTOLOGY CMNT CVX/VAG CYTO-IMP: NORMAL
HPV HR 12 DNA GENITAL QL NAA+PROBE: NEGATIVE
HPV HR GENOTYPES PNL CVX NAA+PROBE: NEGATIVE
HPV16 DNA SPEC QL NAA+PROBE: NEGATIVE
HPV18 DNA SPEC QL NAA+PROBE: NEGATIVE
LAB AP HPV GENOTYPE QUESTION: YES
LAB AP HPV HR: NORMAL
LABORATORY COMMENT REPORT: NORMAL
PATH REPORT.TOTAL CANCER: NORMAL

## 2024-02-02 RX ORDER — TIZANIDINE 4 MG/1
4 TABLET ORAL EVERY 8 HOURS PRN
Qty: 30 TABLET | Refills: 0 | Status: SHIPPED | OUTPATIENT
Start: 2024-02-02 | End: 2024-02-12

## 2024-02-21 ENCOUNTER — APPOINTMENT (OUTPATIENT)
Dept: DERMATOLOGY | Facility: CLINIC | Age: 64
End: 2024-02-21
Payer: MEDICARE

## 2024-03-08 ENCOUNTER — HOSPITAL ENCOUNTER (OUTPATIENT)
Dept: RADIOLOGY | Facility: CLINIC | Age: 64
Discharge: HOME | End: 2024-03-08
Payer: MEDICARE

## 2024-03-08 DIAGNOSIS — Z78.0 MENOPAUSE: ICD-10-CM

## 2024-03-08 DIAGNOSIS — Z13.820 SCREENING FOR OSTEOPOROSIS: ICD-10-CM

## 2024-03-08 DIAGNOSIS — Z12.31 ENCOUNTER FOR SCREENING MAMMOGRAM FOR MALIGNANT NEOPLASM OF BREAST: ICD-10-CM

## 2024-03-08 PROCEDURE — 77063 BREAST TOMOSYNTHESIS BI: CPT | Performed by: RADIOLOGY

## 2024-03-08 PROCEDURE — 77080 DXA BONE DENSITY AXIAL: CPT

## 2024-03-08 PROCEDURE — 77067 SCR MAMMO BI INCL CAD: CPT | Performed by: RADIOLOGY

## 2024-03-08 PROCEDURE — 77067 SCR MAMMO BI INCL CAD: CPT

## 2024-07-10 ENCOUNTER — LAB (OUTPATIENT)
Dept: LAB | Facility: LAB | Age: 64
End: 2024-07-10
Payer: MEDICARE

## 2024-07-10 DIAGNOSIS — E03.9 ACQUIRED HYPOTHYROIDISM: ICD-10-CM

## 2024-07-10 DIAGNOSIS — E78.2 HYPERLIPIDEMIA, MIXED: ICD-10-CM

## 2024-07-10 DIAGNOSIS — E55.9 VITAMIN D DEFICIENCY: ICD-10-CM

## 2024-07-10 DIAGNOSIS — R07.9 CHEST PAIN, UNSPECIFIED TYPE: ICD-10-CM

## 2024-07-10 DIAGNOSIS — Z00.00 ROUTINE ADULT HEALTH MAINTENANCE: ICD-10-CM

## 2024-07-10 DIAGNOSIS — E03.9 HYPOTHYROIDISM, UNSPECIFIED TYPE: ICD-10-CM

## 2024-07-10 DIAGNOSIS — F41.9 ANXIETY: ICD-10-CM

## 2024-07-10 DIAGNOSIS — R53.83 FATIGUE, UNSPECIFIED TYPE: ICD-10-CM

## 2024-07-10 DIAGNOSIS — I10 PRIMARY HYPERTENSION: ICD-10-CM

## 2024-07-10 DIAGNOSIS — M19.90 ARTHRITIS: ICD-10-CM

## 2024-07-10 DIAGNOSIS — N95.1 SYMPTOMATIC MENOPAUSAL OR FEMALE CLIMACTERIC STATES: ICD-10-CM

## 2024-07-10 DIAGNOSIS — Z79.899 MEDICATION MANAGEMENT: ICD-10-CM

## 2024-07-10 DIAGNOSIS — F32.0 CURRENT MILD EPISODE OF MAJOR DEPRESSIVE DISORDER WITHOUT PRIOR EPISODE (CMS-HCC): ICD-10-CM

## 2024-07-10 LAB
25(OH)D3 SERPL-MCNC: 52 NG/ML (ref 30–100)
ALBUMIN SERPL BCP-MCNC: 4.1 G/DL (ref 3.4–5)
ALP SERPL-CCNC: 80 U/L (ref 33–136)
ALT SERPL W P-5'-P-CCNC: 14 U/L (ref 7–45)
ANION GAP SERPL CALC-SCNC: 12 MMOL/L (ref 10–20)
APPEARANCE UR: ABNORMAL
AST SERPL W P-5'-P-CCNC: 19 U/L (ref 9–39)
BASOPHILS # BLD AUTO: 0.03 X10*3/UL (ref 0–0.1)
BASOPHILS NFR BLD AUTO: 0.6 %
BILIRUB SERPL-MCNC: 0.6 MG/DL (ref 0–1.2)
BILIRUB UR STRIP.AUTO-MCNC: NEGATIVE MG/DL
BUN SERPL-MCNC: 13 MG/DL (ref 6–23)
CALCIUM SERPL-MCNC: 9.4 MG/DL (ref 8.6–10.3)
CHLORIDE SERPL-SCNC: 105 MMOL/L (ref 98–107)
CHOLEST SERPL-MCNC: 244 MG/DL (ref 0–199)
CHOLESTEROL/HDL RATIO: 4
CO2 SERPL-SCNC: 27 MMOL/L (ref 21–32)
COLOR UR: ABNORMAL
CREAT SERPL-MCNC: 0.72 MG/DL (ref 0.5–1.05)
EGFRCR SERPLBLD CKD-EPI 2021: >90 ML/MIN/1.73M*2
EOSINOPHIL # BLD AUTO: 0.14 X10*3/UL (ref 0–0.7)
EOSINOPHIL NFR BLD AUTO: 2.6 %
ERYTHROCYTE [DISTWIDTH] IN BLOOD BY AUTOMATED COUNT: 13.4 % (ref 11.5–14.5)
GLUCOSE SERPL-MCNC: 89 MG/DL (ref 74–99)
GLUCOSE UR STRIP.AUTO-MCNC: NORMAL MG/DL
HCT VFR BLD AUTO: 45.4 % (ref 36–46)
HDLC SERPL-MCNC: 61.2 MG/DL
HGB BLD-MCNC: 14.4 G/DL (ref 12–16)
HOLD SPECIMEN: NORMAL
IMM GRANULOCYTES # BLD AUTO: 0.01 X10*3/UL (ref 0–0.7)
IMM GRANULOCYTES NFR BLD AUTO: 0.2 % (ref 0–0.9)
KETONES UR STRIP.AUTO-MCNC: NEGATIVE MG/DL
LDLC SERPL CALC-MCNC: 164 MG/DL
LEUKOCYTE ESTERASE UR QL STRIP.AUTO: NEGATIVE
LYMPHOCYTES # BLD AUTO: 1.69 X10*3/UL (ref 1.2–4.8)
LYMPHOCYTES NFR BLD AUTO: 31.8 %
MCH RBC QN AUTO: 31.2 PG (ref 26–34)
MCHC RBC AUTO-ENTMCNC: 31.7 G/DL (ref 32–36)
MCV RBC AUTO: 98 FL (ref 80–100)
MONOCYTES # BLD AUTO: 0.57 X10*3/UL (ref 0.1–1)
MONOCYTES NFR BLD AUTO: 10.7 %
NEUTROPHILS # BLD AUTO: 2.87 X10*3/UL (ref 1.2–7.7)
NEUTROPHILS NFR BLD AUTO: 54.1 %
NITRITE UR QL STRIP.AUTO: NEGATIVE
NON HDL CHOLESTEROL: 183 MG/DL (ref 0–149)
NRBC BLD-RTO: 0 /100 WBCS (ref 0–0)
PH UR STRIP.AUTO: 7.5 [PH]
PLATELET # BLD AUTO: 240 X10*3/UL (ref 150–450)
POTASSIUM SERPL-SCNC: 4.2 MMOL/L (ref 3.5–5.3)
PROT SERPL-MCNC: 7.3 G/DL (ref 6.4–8.2)
PROT UR STRIP.AUTO-MCNC: NEGATIVE MG/DL
RBC # BLD AUTO: 4.62 X10*6/UL (ref 4–5.2)
RBC # UR STRIP.AUTO: NEGATIVE /UL
SODIUM SERPL-SCNC: 140 MMOL/L (ref 136–145)
SP GR UR STRIP.AUTO: 1.02
TRIGL SERPL-MCNC: 96 MG/DL (ref 0–149)
TSH SERPL-ACNC: 2.7 MIU/L (ref 0.44–3.98)
UROBILINOGEN UR STRIP.AUTO-MCNC: NORMAL MG/DL
VIT B12 SERPL-MCNC: 419 PG/ML (ref 211–911)
VLDL: 19 MG/DL (ref 0–40)
WBC # BLD AUTO: 5.3 X10*3/UL (ref 4.4–11.3)

## 2024-07-10 PROCEDURE — 86225 DNA ANTIBODY NATIVE: CPT

## 2024-07-10 PROCEDURE — 83540 ASSAY OF IRON: CPT | Mod: WAIVER OF LIABILITY ON FILE

## 2024-07-10 PROCEDURE — 84443 ASSAY THYROID STIM HORMONE: CPT

## 2024-07-10 PROCEDURE — 82607 VITAMIN B-12: CPT

## 2024-07-10 PROCEDURE — 36415 COLL VENOUS BLD VENIPUNCTURE: CPT

## 2024-07-10 PROCEDURE — 80053 COMPREHEN METABOLIC PANEL: CPT

## 2024-07-10 PROCEDURE — 86038 ANTINUCLEAR ANTIBODIES: CPT

## 2024-07-10 PROCEDURE — 82728 ASSAY OF FERRITIN: CPT | Mod: WAIVER OF LIABILITY ON FILE

## 2024-07-10 PROCEDURE — 83550 IRON BINDING TEST: CPT | Mod: WAIVER OF LIABILITY ON FILE

## 2024-07-10 PROCEDURE — 81003 URINALYSIS AUTO W/O SCOPE: CPT

## 2024-07-10 PROCEDURE — 80061 LIPID PANEL: CPT

## 2024-07-10 PROCEDURE — 82306 VITAMIN D 25 HYDROXY: CPT

## 2024-07-10 PROCEDURE — 83970 ASSAY OF PARATHORMONE: CPT

## 2024-07-10 PROCEDURE — 86235 NUCLEAR ANTIGEN ANTIBODY: CPT

## 2024-07-10 PROCEDURE — 85025 COMPLETE CBC W/AUTO DIFF WBC: CPT

## 2024-07-11 ENCOUNTER — APPOINTMENT (OUTPATIENT)
Dept: PRIMARY CARE | Facility: CLINIC | Age: 64
End: 2024-07-11
Payer: MEDICARE

## 2024-07-11 VITALS
SYSTOLIC BLOOD PRESSURE: 106 MMHG | HEART RATE: 73 BPM | TEMPERATURE: 98.2 F | BODY MASS INDEX: 24.95 KG/M2 | OXYGEN SATURATION: 95 % | DIASTOLIC BLOOD PRESSURE: 72 MMHG | WEIGHT: 155.25 LBS | HEIGHT: 66 IN

## 2024-07-11 DIAGNOSIS — N95.1 SYMPTOMATIC MENOPAUSAL OR FEMALE CLIMACTERIC STATES: ICD-10-CM

## 2024-07-11 DIAGNOSIS — Z13.6 SCREENING FOR CARDIOVASCULAR CONDITION: ICD-10-CM

## 2024-07-11 DIAGNOSIS — E78.2 HYPERLIPIDEMIA, MIXED: ICD-10-CM

## 2024-07-11 DIAGNOSIS — R07.9 CHEST PAIN, UNSPECIFIED TYPE: ICD-10-CM

## 2024-07-11 DIAGNOSIS — F32.0 CURRENT MILD EPISODE OF MAJOR DEPRESSIVE DISORDER WITHOUT PRIOR EPISODE (CMS-HCC): Primary | ICD-10-CM

## 2024-07-11 DIAGNOSIS — T63.301A SPIDER BITE WOUND, ACCIDENTAL OR UNINTENTIONAL, INITIAL ENCOUNTER: ICD-10-CM

## 2024-07-11 DIAGNOSIS — F41.9 ANXIETY: ICD-10-CM

## 2024-07-11 DIAGNOSIS — I10 PRIMARY HYPERTENSION: ICD-10-CM

## 2024-07-11 DIAGNOSIS — R94.31 ABNORMAL EKG: ICD-10-CM

## 2024-07-11 DIAGNOSIS — E55.9 VITAMIN D DEFICIENCY: ICD-10-CM

## 2024-07-11 DIAGNOSIS — R53.83 FATIGUE, UNSPECIFIED TYPE: ICD-10-CM

## 2024-07-11 PROBLEM — R07.81 RIB TENDERNESS: Status: RESOLVED | Noted: 2023-12-28 | Resolved: 2024-07-11

## 2024-07-11 PROBLEM — R51.9 HEADACHE: Status: RESOLVED | Noted: 2023-12-28 | Resolved: 2024-07-11

## 2024-07-11 PROBLEM — M54.6 THORACIC SPINE PAIN: Status: RESOLVED | Noted: 2023-12-28 | Resolved: 2024-07-11

## 2024-07-11 PROBLEM — R00.2 PALPITATIONS: Status: RESOLVED | Noted: 2023-05-29 | Resolved: 2024-07-11

## 2024-07-11 PROBLEM — R26.2 DIFFICULTY WALKING: Status: RESOLVED | Noted: 2023-12-28 | Resolved: 2024-07-11

## 2024-07-11 PROBLEM — R10.9 LEFT FLANK PAIN: Status: RESOLVED | Noted: 2023-12-28 | Resolved: 2024-07-11

## 2024-07-11 PROBLEM — R07.89 ATYPICAL CHEST PAIN: Status: RESOLVED | Noted: 2023-12-28 | Resolved: 2024-07-11

## 2024-07-11 PROBLEM — R93.89 ULTRASOUND SCAN ABNORMAL: Status: RESOLVED | Noted: 2023-12-28 | Resolved: 2024-07-11

## 2024-07-11 PROBLEM — L50.9 URTICARIA: Status: RESOLVED | Noted: 2023-05-29 | Resolved: 2024-07-11

## 2024-07-11 PROBLEM — R11.0 NAUSEA: Status: RESOLVED | Noted: 2023-12-28 | Resolved: 2024-07-11

## 2024-07-11 LAB
FERRITIN SERPL-MCNC: 104 NG/ML (ref 8–150)
IRON SATN MFR SERPL: 32 % (ref 25–45)
IRON SERPL-MCNC: 107 UG/DL (ref 35–150)
PTH-INTACT SERPL-MCNC: 22.6 PG/ML (ref 18.5–88)
TIBC SERPL-MCNC: 335 UG/DL (ref 240–445)
UIBC SERPL-MCNC: 228 UG/DL (ref 110–370)

## 2024-07-11 RX ORDER — FLUOCINONIDE 0.5 MG/G
CREAM TOPICAL 2 TIMES DAILY
Qty: 15 G | Refills: 0 | Status: SHIPPED | OUTPATIENT
Start: 2024-07-11 | End: 2025-07-11

## 2024-07-11 RX ORDER — SERTRALINE HYDROCHLORIDE 50 MG/1
50 TABLET, FILM COATED ORAL DAILY
Qty: 90 TABLET | Refills: 3 | Status: SHIPPED | OUTPATIENT
Start: 2024-07-11

## 2024-07-11 ASSESSMENT — PATIENT HEALTH QUESTIONNAIRE - PHQ9
1. LITTLE INTEREST OR PLEASURE IN DOING THINGS: NOT AT ALL
2. FEELING DOWN, DEPRESSED OR HOPELESS: NOT AT ALL
SUM OF ALL RESPONSES TO PHQ9 QUESTIONS 1 AND 2: 0

## 2024-07-11 NOTE — PROGRESS NOTES
CC/HPI:   Follow-up (6 months /Tired a lot of the time/Look at spider bites?).  States is tired a lot  Wakes up tired  Sleeps 8 hours/night  Wakes up to urinate and able to fall back asleep   has sleep apnea  No apnea symptoms    On Zoloft  Doesnt feel worse as far as depression  Does feel fatigued when she increases her dose  Has taken wellbutrin in past when tried to quit smoking    C/o episodes of CP  Anxiety brings it on it seems  Not sure if exertional  Father: CAD/MI in 50's,   3/13/20 stress test: The exercise stress echo was negative for ischemia at 100 % of MPHR (11.1 METS). Good functional capacity for age and gender. - The left ventricle is normal in size. Left ventricular systolic function is normal. EF = 66 5% (2D biplane)     Labs reviewed:  Component      Latest Ref Rng 7/10/2024   LEUKOCYTES (10*3/UL) IN BLOOD BY AUTOMATED COUNT, Spanish      4.4 - 11.3 x10*3/uL 5.3    nRBC      0.0 - 0.0 /100 WBCs 0.0    ERYTHROCYTES (10*6/UL) IN BLOOD BY AUTOMATED COUNT, Spanish      4.00 - 5.20 x10*6/uL 4.62    HEMOGLOBIN      12.0 - 16.0 g/dL 14.4    HEMATOCRIT      36.0 - 46.0 % 45.4    MCV      80 - 100 fL 98    MCH      26.0 - 34.0 pg 31.2    MCHC      32.0 - 36.0 g/dL 31.7 (L)    RED CELL DISTRIBUTION WIDTH      11.5 - 14.5 % 13.4    PLATELETS (10*3/UL) IN BLOOD AUTOMATED COUNT, Spanish      150 - 450 x10*3/uL 240    NEUTROPHILS/100 LEUKOCYTES IN BLOOD BY AUTOMATED COUNT, Spanish      40.0 - 80.0 % 54.1    Immature Granulocytes %, Automated      0.0 - 0.9 % 0.2    Lymphocytes %      13.0 - 44.0 % 31.8    Monocytes %      2.0 - 10.0 % 10.7    Eosinophils %      0.0 - 6.0 % 2.6    Basophils %      0.0 - 2.0 % 0.6    NEUTROPHILS (10*3/UL) IN BLOOD BY AUTOMATED COUNT, Spanish      1.20 - 7.70 x10*3/uL 2.87    Immature Granulocytes Absolute, Automated      0.00 - 0.70 x10*3/uL 0.01    Lymphocytes Absolute      1.20 - 4.80 x10*3/uL 1.69    Monocytes Absolute      0.10 - 1.00 x10*3/uL 0.57    Eosinophils  Absolute      0.00 - 0.70 x10*3/uL 0.14    Basophils Absolute      0.00 - 0.10 x10*3/uL 0.03    GLUCOSE      74 - 99 mg/dL 89    SODIUM      136 - 145 mmol/L 140    POTASSIUM      3.5 - 5.3 mmol/L 4.2    CHLORIDE      98 - 107 mmol/L 105    Bicarbonate      21 - 32 mmol/L 27    Anion Gap      10 - 20 mmol/L 12    Blood Urea Nitrogen      6 - 23 mg/dL 13    Creatinine      0.50 - 1.05 mg/dL 0.72    EGFR      >60 mL/min/1.73m*2 >90    Calcium      8.6 - 10.3 mg/dL 9.4    Albumin      3.4 - 5.0 g/dL 4.1    Alkaline Phosphatase      33 - 136 U/L 80    Total Protein      6.4 - 8.2 g/dL 7.3    AST      9 - 39 U/L 19    Bilirubin Total      0.0 - 1.2 mg/dL 0.6    ALT      7 - 45 U/L 14    Color, Urine      Light-Yellow, Yellow, Dark-Yellow  Light-Yellow    Appearance, Urine      Clear  Turbid ! (N)    Specific Gravity, Urine      1.005 - 1.035  1.019    pH, Urine      5.0, 5.5, 6.0, 6.5, 7.0, 7.5, 8.0  7.5    Protein, Urine      NEGATIVE, 10 (TRACE), 20 (TRACE) mg/dL NEGATIVE    Glucose, Urine      Normal mg/dL Normal    Blood, Urine      NEGATIVE  NEGATIVE    Ketones, Urine      NEGATIVE mg/dL NEGATIVE    Bilirubin, Urine      NEGATIVE  NEGATIVE    Urobilinogen, Urine      Normal mg/dL Normal    Nitrite, Urine      NEGATIVE  NEGATIVE    Leukocyte Esterase, Urine      NEGATIVE  NEGATIVE    CHOLESTEROL      0 - 199 mg/dL 244 (H)    HDL CHOLESTEROL      mg/dL 61.2    Cholesterol/HDL Ratio 4.0    LDL Calculated      <=99 mg/dL 164 (H)    VLDL      0 - 40 mg/dL 19    TRIGLYCERIDES      0 - 149 mg/dL 96    Non HDL Cholesterol      0 - 149 mg/dL 183 (H)    Thyroid Stimulating Hormone      0.44 - 3.98 mIU/L 2.70    Vitamin D, 25-Hydroxy, Total      30 - 100 ng/mL 52    Vitamin B12      211 - 911 pg/mL 419    Extra Tube Hold for add-ons.       Assessment and Plan:  Problem List Items Addressed This Visit          Medium    Anxiety    Overview     On Zoloft  Failed Buspar, Effexor, and Paxil (AE)         Relevant Medications     sertraline (Zoloft) 50 mg tablet    Other Relevant Orders    TSH with reflex to Free T4 if abnormal    Iron and TIBC (Completed)    Ferritin (Completed)    Current mild episode of major depressive disorder without prior episode (CMS-HCC) - Primary    Overview     On Zoloft (25-50mg)  Failed Buspar, Effexor and Paxil (AE)           Relevant Orders    Iron and TIBC (Completed)    Ferritin (Completed)    Hyperlipidemia, mixed    Overview     Goal LDl <130         Current Assessment & Plan     Can work on diet  CT Calcium   Consider statin         Relevant Orders    Lipid panel    Comprehensive Metabolic Panel    CBC and Auto Differential    Lipid Panel    Urinalysis with Reflex Culture and Microscopic    Iron and TIBC (Completed)    Ferritin (Completed)    PTH, intact    Hypertension    Overview     7/15/22: hers 127/74 hr 75 ours 121/74 hr 76  S/p norvasc  Has seen Dr Flannery   Currently controlled without antihypertensive. Will continue to monitor  1/18/24: IO EKG done         Relevant Orders    CT cardiac scoring wo IV contrast    Iron and TIBC (Completed)    Ferritin (Completed)    Symptomatic menopausal or female climacteric states    Current Assessment & Plan     Try Vit E         Relevant Orders    Iron and TIBC (Completed)    Ferritin (Completed)    Vitamin D deficiency    Overview     8/8/22: 22Goal ~50;         Relevant Orders    Vitamin D 25-Hydroxy,Total (for eval of Vitamin D levels)    Iron and TIBC (Completed)    Ferritin (Completed)     Other Visit Diagnoses       Screening for cardiovascular condition        Relevant Orders    CT cardiac scoring wo IV contrast    Chest pain, unspecified type        suspect anxiety driven  will get CT calcium score  If (+) for plaque would pursue stress test  If (-) will focus on anxiety control    Relevant Orders    ECG 12 lead (Clinic Performed) (Completed)    Iron and TIBC (Completed)    Ferritin (Completed)    Nuclear Stress Test    Spider bite wound, accidental or  "unintentional, initial encounter        will try higher dose HCC    Relevant Medications    fluocinonide (Lidex) 0.05 % cream    Fatigue, unspecified type        suspect due to depression/anxiety  will try daily exercise, good diet  reassess  consider change SSRI to alternative med  Check labs    Relevant Orders    Iron and TIBC (Completed)    Ferritin (Completed)    SAMIA with Reflex to FRANCOISE    PTH, intact    Abnormal EKG        Relevant Orders    Nuclear Stress Test            ROS otherwise negative aside from what was mentioned above in HPI.    Vitals  /72   Pulse 73   Temp 36.8 °C (98.2 °F)   Ht 1.676 m (5' 6\")   Wt 70.4 kg (155 lb 4 oz)   LMP  (LMP Unknown)   SpO2 95%   BMI 25.06 kg/m²   Body mass index is 25.06 kg/m².  Physical Exam  Gen: Alert, NAD  HEENT: Unremarkable  Respiratory:  Lungs CTAB  CV: RRR  Neuro:  Gross motor and sensory intact  Skin: Scabbed likely spider bites (or other insect)  NO cellulitic findings    EKG: NSR, flipped T wave V1 , inverted T wave V3 (both changed from prior EKG 1/24)  Asymptomatic presently      Allergies and Medications  Aspirin, Azithromycin, Buspirone, Darvocet a500 [propoxyphene n-acetaminophen], Paroxetine, and Propoxyphene  Current Outpatient Medications   Medication Instructions    albuterol 90 mcg/actuation inhaler 2 puffs, inhalation, Every 4 hours PRN    cholecalciferol (Vitamin D-3) 125 MCG (5000 UT) capsule 1 capsule, oral, Daily    cyanocobalamin (Vitamin B-12) 1,000 mcg tablet 1 tablet, oral, Daily    fluocinonide (Lidex) 0.05 % cream Topical, 2 times daily    sertraline (ZOLOFT) 50 mg, oral, Daily    vitamin E 45 mg (100 unit) capsule 1 capsule, oral, Daily         "

## 2024-07-15 LAB
ANA PATTERN: ABNORMAL
ANA SER QL HEP2 SUBST: POSITIVE
ANA TITR SER IF: ABNORMAL {TITER}
CENTROMERE B AB SER-ACNC: <0.2 AI
CHROMATIN AB SERPL-ACNC: 0.8 AI
CYTOPLASMIC PATTERN: ABNORMAL
DSDNA AB SER-ACNC: 1 IU/ML
ENA JO1 AB SER QL IA: <0.2 AI
ENA RNP AB SER IA-ACNC: 0.4 AI
ENA SCL70 AB SER QL IA: <0.2 AI
ENA SM AB SER IA-ACNC: <0.2 AI
ENA SM+RNP AB SER QL IA: 1.5 AI
ENA SS-A AB SER IA-ACNC: 0.2 AI
ENA SS-B AB SER IA-ACNC: <0.2 AI
RIBOSOMAL P AB SER-ACNC: <0.2 AI

## 2024-07-17 ENCOUNTER — OFFICE VISIT (OUTPATIENT)
Dept: RHEUMATOLOGY | Facility: CLINIC | Age: 64
End: 2024-07-17
Payer: MEDICARE

## 2024-07-17 VITALS
TEMPERATURE: 98 F | BODY MASS INDEX: 24.75 KG/M2 | SYSTOLIC BLOOD PRESSURE: 123 MMHG | HEART RATE: 71 BPM | DIASTOLIC BLOOD PRESSURE: 79 MMHG | HEIGHT: 66 IN | WEIGHT: 154 LBS

## 2024-07-17 DIAGNOSIS — G56.02 CARPAL TUNNEL SYNDROME OF LEFT WRIST: Primary | ICD-10-CM

## 2024-07-17 DIAGNOSIS — R76.8 ANA POSITIVE: ICD-10-CM

## 2024-07-17 DIAGNOSIS — R53.83 FATIGUE, UNSPECIFIED TYPE: ICD-10-CM

## 2024-07-17 DIAGNOSIS — M19.90 ARTHRITIS: ICD-10-CM

## 2024-07-17 PROBLEM — R53.82 CHRONIC FATIGUE: Status: ACTIVE | Noted: 2024-07-17

## 2024-07-17 PROCEDURE — 1123F ACP DISCUSS/DSCN MKR DOCD: CPT | Performed by: INTERNAL MEDICINE

## 2024-07-17 PROCEDURE — 3008F BODY MASS INDEX DOCD: CPT | Performed by: INTERNAL MEDICINE

## 2024-07-17 PROCEDURE — 1160F RVW MEDS BY RX/DR IN RCRD: CPT | Performed by: INTERNAL MEDICINE

## 2024-07-17 PROCEDURE — 99204 OFFICE O/P NEW MOD 45 MIN: CPT | Performed by: INTERNAL MEDICINE

## 2024-07-17 PROCEDURE — 3078F DIAST BP <80 MM HG: CPT | Performed by: INTERNAL MEDICINE

## 2024-07-17 PROCEDURE — 1036F TOBACCO NON-USER: CPT | Performed by: INTERNAL MEDICINE

## 2024-07-17 PROCEDURE — 1159F MED LIST DOCD IN RCRD: CPT | Performed by: INTERNAL MEDICINE

## 2024-07-17 PROCEDURE — 3074F SYST BP LT 130 MM HG: CPT | Performed by: INTERNAL MEDICINE

## 2024-07-17 NOTE — PATIENT INSTRUCTIONS
We did not find any evidence of an autoimmune disease    Please wear the wrist splint at night for the numbness and tingling, if symptoms do not improve you would need an EMG study to see the severity of carpal tunnel syndrome     Wear the thumb spica splint for the thumb pain in the am as tolerated  Use tylenol extra strength 2 tablets upto three times a day  And apply diclofenac/voltaren gel for the thumb    Follow up with us in future if thumb pain gets worse or your primary care as future concerns about an autoimmune disease.    l

## 2024-07-17 NOTE — PROGRESS NOTES
Subjective   Patient ID: [id number]   Krystal Angulo is a 64 y.o. female who presents for Abnormal Lab.  HPI  Here for new visit  64 year old with hx of Hip OA, Hand OA hx of CTS bilaterally, s/p release right hand  Anxiety/depression on Zoloft  Referred for positive SAMIA and RNP low titre, tests were performed because of chronic fatigue for a year or two  Feels lack of energy, without diurnal variation, still able to power through her day  Minimal MST < 10 mins, no issues with activities of daily living like donnig,doffing, taking a shower, combing her hair, standing from a sitting position  Denies dysphagia or other GI issues  No fevers, rashes, photosensitivity, Raynauds, skin tightening, shortness of breath or cough      Occasional pain in the bilateral CMC more on the left relieved with tylenol  Occasional numbness and tingling in the left hand, at times wakes her up and shaking her hand helps  Minimal loss of dexterity, Cannot snap her fingers, but otherwise strength is good  Not dropping things from her hands  No swelling of the joints  Chronic left hip pain, was evaluated by ortho, conservative measures      , has children  Dealing with family stress but overall anxiety is under control    ROS  Constitutional: Denies fever, chills, weight loss, night sweats or headaches  Eyes: Denies dry eyes, blurry vision, redness or pain or photophobia  ENT: Denies dry mouth, dental loss, loss of taste, nasal or oral ulcers, jaw claudication, difficulty swallowing, nasal crusting or recurrent sinus infections   Cardiovascular: Denies chest pain, palpitations, orthopnea  Respiratory: Denies shortness of breath, cough, asthma, or recurrent respiratory infections  Gastrointestinal: Denies dysphagia, nausea, vomiting, heartburn, abdominal pain, constipation, diarrhea, melena or hematochezia  Genitourinary: No recurrent urinary infections or STDs, no genital or anal ulcers.  Integumentary: Denies  photosensitivity, rash or lesions, Raynaud's phenomenon, skin tightening, digital ulcers, psoriatic lesions, or alopecia  Neurological: Denies any numbness or tingling, muscle weakness, or incontinence   Hematologic/Lymphatic: Denies bleeding, bruising, history of clots (arterial or venous), or abortions/miscarriages/pregnancy complications   MSK: No joint pains, redness, hotness or swelling. No inflammatory back pain, enthesitis, dactylitis. No morning stiffness   Muscular: Denies weakness, difficulty rising from chair or combing the hair, muscle aches, or problems with hand    FHx: No family history of autoimmune diseases       Patient Active Problem List   Diagnosis    Routine adult health maintenance    BMI 25.0-25.9,adult    Anxiety    Arthritis    Cervical spondylosis with radiculopathy    H/O fracture    Current mild episode of major depressive disorder without prior episode (CMS-HCC)    Diverticulosis of colon    Hyperlipidemia, mixed    Hypertension    Hypothyroidism    Degeneration of intervertebral disc of cervical region    Degeneration of intervertebral disc of lumbar region    Lumbar spondylosis    Multiple premature ventricular complexes    AMIRAH (stress urinary incontinence, female)    Urinary incontinence in female    Vitamin D deficiency    Advanced care planning/counseling discussion    Cardiac risk counseling    Screening for osteoporosis    Symptomatic menopausal or female climacteric states    Encounter for screening mammogram for malignant neoplasm of breast    Encounter for screening for malignant neoplasm of colon    Screening for cervical cancer        Past Medical History:   Diagnosis Date    H/O bone density study 02/2024    normal    H/O cardiovascular stress test 08/07/2020    3/13: The exercise stress echo was negative for ischemia at 100 % of MPHR (11.1 METS). Good functional capacity for age and gender. - The left ventricle is normal in size. Left ventricular systolic function is  normal. EF = 66  5% (2D biplane)    H/O chest x-ray     7/22: normal 3/18 (-) 5/19: ribs & chest neg 2/18/16 (-)    H/O chest x-ray 01/08/2024    bilat ribs & visualized chest normal    H/O CT scan of brain     7/22: normal    H/O diagnostic ultrasound     7/20: Renal US (-)    H/O echocardiogram     8/12/22: 1. The left ventricular systolic function is normal with a 60-65% estimated ejection fraction. 2. RVSP within normal limits.    H/O magnetic resonance imaging of cervical spine     2010: disc bulging with spurring C4-5-6, C6-7    H/O magnetic resonance imaging of lumbar spine     010: L2 disc herniation, bulging discs L3, L4 8/20: Multilevel spondylosis. Disc osteophyte complex with superimposed laeft paracentral herniation at L4-5 causeing severe left foraminal, moderate centra canal and right neural foraminal stenosis. Disc osteophyte complex at L3-4 and L5-S1 causing mild to moderate neural foraminal and central canal stenosis. Disc bulge at L1-3    H/O pelvic ultrasound     2011; Dr García; intramural fibroid    H/O pelvic ultrasound     2011; fibroid    History of Holter monitoring     8/22:  HR , avg 82. SVE burden 0.03%, max count per 24 hours= 30 PVC burden 0.03%, max count per 24hours =39, 2 disparate morphologies SVT: 9 events, longest 28 beats, fastest event 116 BPM VT: 1 event, 3 beats, 169BPM Pt recorded 9 events 7/22: 1. The basic rhythm was sinus, ranging from 54 to 152 beats per minute, with an average of 87 beats per minute.  2. Rare isolated PVCs/PACs        Past Surgical History:   Procedure Laterality Date    CARPAL TUNNEL RELEASE  09/17/2018    Neuroplasty Decompression Median Nerve At Carpal Tunnel    COLONOSCOPY  10/22/2018    2012: Multiple large mouth diverticuli in sigmoid    DILATION AND CURETTAGE OF UTERUS  09/17/2018    Dilation And Curettage    LAPAROSCOPY DIAGNOSTIC / BIOPSY / ASPIRATION / LYSIS  09/17/2018    Laparoscopy (Diagnostic)    VARICOSE VEIN SURGERY  09/17/2018     Varicose Vein Ligation        Social History     Socioeconomic History    Marital status:      Spouse name: Not on file    Number of children: Not on file    Years of education: Not on file    Highest education level: Not on file   Occupational History    Not on file   Tobacco Use    Smoking status: Former     Types: Cigarettes    Smokeless tobacco: Never   Substance and Sexual Activity    Alcohol use: Yes     Comment: rarely    Drug use: Never    Sexual activity: Not on file   Other Topics Concern    Not on file   Social History Narrative    Social History    3 kids,      Doesnt work presently    Nonsmoker    Social  ETOH    occasional marijuana use    ---    Family History:    F: HTN, CAD/MI age 61, Prostate CA, NHL, Lung CA ( age 90)    M:  Arthritis /DJD, TKA bilaterally, Depression, back issues, Alzheimers Dementia, AFIB;(  age 89)    Son:  Anxiety/Depression (Manish)    B:  Hypertension                                                 S:  Anxiety/ BRYAN    PGF:  Stroke                                           MGM:  Diabetes    PGM: Thyroid CA                                        D: Shannon Kaminski     Social Determinants of Health     Financial Resource Strain: Not on file   Food Insecurity: Not on file   Transportation Needs: Not on file   Physical Activity: Not on file   Stress: Not on file   Social Connections: Not on file   Intimate Partner Violence: Not on file   Housing Stability: Not on file        Allergies   Allergen Reactions    Aspirin Unknown     Tolerates motrin     Azithromycin Unknown     Elevated BP    Buspirone Unknown    Darvocet A500 [Propoxyphene N-Acetaminophen] Other     GI sx     Paroxetine Unknown and Other     Sexual Side Effects   Sinus symptoms    Propoxyphene Other          Current Outpatient Medications:     albuterol 90 mcg/actuation inhaler, Inhale 2 puffs every 4 hours if needed for wheezing or shortness of breath., Disp: 18 g, Rfl: 2     cholecalciferol (Vitamin D-3) 125 MCG (5000 UT) capsule, Take 1 capsule (125 mcg) by mouth once daily., Disp: , Rfl:     cyanocobalamin (Vitamin B-12) 1,000 mcg tablet, Take 1 tablet (1,000 mcg) by mouth once daily., Disp: , Rfl:     fluocinonide (Lidex) 0.05 % cream, Apply topically 2 times a day., Disp: 15 g, Rfl: 0    sertraline (Zoloft) 50 mg tablet, Take 1 tablet (50 mg) by mouth once daily., Disp: 90 tablet, Rfl: 3    vitamin E 45 mg (100 unit) capsule, Take 1 capsule (45 mg) by mouth once daily., Disp: , Rfl:        Objective     Visit Vitals  /79 (BP Location: Left arm, Patient Position: Sitting, BP Cuff Size: Adult)   Pulse 71   Temp 36.7 °C (98 °F)        Physical Exam  General: AAOx3, Cooperative  Head: normocephalic, atraumatic  Eyes: EOMI, conjunctiva clear, sclera white, anicteric  Ears: no redness, swelling, tenderness  Nose: no deformity, no crusting   Throat/Mouth: No oral deformities, no cheek swelling, mucosa appear moist, no oral ulcers noted or loss of dentition   Neck/Lymph: FROM, trachea midline  Lungs: chest expansion symmetric. No respiratory distress.   Heart: RRR  Neuro: CN II-XII grossly intact, no focal deficit  Skin: No rashes, ulcers or photosensitive areas  MSK: Upper Extremities:  Hand/Fingers: minimal squaring of 1st CMC   Phalens positive left hand  No loss of intrinsic muscles of hands    No erythema, swelling, tenderness or warmth at DIP, PIP, or MCP joints, FROM grossly. Good hand . No nodules. No deformities   Wrists: No erythema, swelling, warmth or tenderness at wrist, FROM grossly  Elbows: No tenderness, swelling, erythema or warmth at elbows, FROM grossly. No nodules   Shoulders: No swelling, erythema, tenderness or warmth at shoulders. FROM  Lower Extremities:   Hips: No obvious deformities. No joint tenderness, normal ROM grossly. Log roll test negative bilaterally. Fantasma test is negative bilaterally. No trochanteric bursae TTP  Knees: No tenderness,  "deformities, swelling, rashes, or warmth, normal ROM grossly. No crepitus, no pes anserine bursa TTP   Ankles: No deformities, tenderness, edema, erythema, ulceration, or warmth at the ankle  Feet: Negative MTP squeeze. Normal ROM grossly.   Spine: No spinal tenderness to palpation. No SI joint tenderness. Gaenslen test negative       [unfilled]      Lab Results   Component Value Date    WBC 5.3 07/10/2024    HGB 14.4 07/10/2024    HCT 45.4 07/10/2024    MCV 98 07/10/2024     07/10/2024        Chemistry    Lab Results   Component Value Date/Time     07/10/2024 1028    K 4.2 07/10/2024 1028     07/10/2024 1028    CO2 27 07/10/2024 1028    BUN 13 07/10/2024 1028    CREATININE 0.72 07/10/2024 1028    Lab Results   Component Value Date/Time    CALCIUM 9.4 07/10/2024 1028    ALKPHOS 80 07/10/2024 1028    AST 19 07/10/2024 1028    ALT 14 07/10/2024 1028    BILITOT 0.6 07/10/2024 1028           No results found for: \"CRP\"   Lab Results   Component Value Date    SAMIA Positive (A) 07/10/2024      No results found for: \"CKTOTAL\"  Lab Results   Component Value Date    NEUTROABS 2.87 07/10/2024      Lab Results   Component Value Date    FERRITIN 104 07/10/2024      No results found for: \"HEPATOT\", \"HEPAIGM\", \"HEPBCIGM\", \"HEPBCAB\", \"HBEAG\", \"HEPCAB\"   Lab Results   Component Value Date    ALT 14 07/10/2024    AST 19 07/10/2024    ALKPHOS 80 07/10/2024    BILITOT 0.6 07/10/2024      No results found for: \"PPD\"   No results found for: \"URICACID\"   Lab Results   Component Value Date    PTH 22.6 07/10/2024    CALCIUM 9.4 07/10/2024      No results found for: \"SPEP\", \"UPEP\"   No results found for: \"ALBUR\", \"[id number]\"   .last          ECG 12 lead (Clinic Performed)  EKG: NSR, flipped T wave V1 , inverted T wave V3 (both changed from prior   EKG 1/24)  Asymptomatic presently     === 01/08/24 ===    XR RIBS 4 VIEWS BILATERAL    - Impression -  No acute osseous abnormality.  Signed by Chito Campbell MD      === " 03/08/24 ===    DEXA BONE DENSITY    - Impression -  DEXA:  According to World Health Organization criteria,  classification is normal.    Followup recommended in 2 years or sooner as clinically warranted.    All images and detailed analysis are available on the  Radiology  PACS.    MACRO:  None    Signed by: Vijay Avery 3/11/2024 8:17 AM  Dictation workstation:   [id number]       Assessment/Plan   Diagnoses and all orders for this visit:  Fatigue, unspecified type    Arthritis  Hand OA  1st CMC  Advised to wear thumb spica splint during the day  And wrist splint at night for the CTS  Tylenol +/- local NSAID for pain  If symptoms do no improve, will consider CSI    CTS   Left wrist  S/p release of right carpal tunnel in the past  No motor weakness  Advised to wear night splint  If no improvement or worsening , proceed with EMG and referral to hand surgery if motor weakness    SAMIA positive  Low titre SAMIA positive with barely positive RNP has no clinical relevance to her symptoms at this juncture  Fatigue likely driven by anxiety/depression  Advised to be UTD wit cancer screening  Otherwise history, exam and labs not suggestive of CTD  Advised to follow up PRN if future concerns about autoimmune disease by other providers in future / or uncontrolled 1st CMC OA.           Rohan Patterson MD   Plan, including risks and benefits, was discussed with the patient, informed on how to reach us.     To schedule an appointment, call  [phone] [place] or call [phone] for [facility]

## 2024-07-18 ENCOUNTER — APPOINTMENT (OUTPATIENT)
Dept: DERMATOLOGY | Facility: CLINIC | Age: 64
End: 2024-07-18
Payer: MEDICARE

## 2024-07-26 ENCOUNTER — HOSPITAL ENCOUNTER (OUTPATIENT)
Dept: CARDIOLOGY | Facility: HOSPITAL | Age: 64
Discharge: HOME | End: 2024-07-26
Payer: MEDICARE

## 2024-07-26 ENCOUNTER — HOSPITAL ENCOUNTER (OUTPATIENT)
Dept: RADIOLOGY | Facility: HOSPITAL | Age: 64
Discharge: HOME | End: 2024-07-26
Payer: MEDICARE

## 2024-07-26 DIAGNOSIS — R07.9 CHEST PAIN, UNSPECIFIED TYPE: ICD-10-CM

## 2024-07-26 DIAGNOSIS — R94.31 ABNORMAL EKG: ICD-10-CM

## 2024-07-26 PROCEDURE — 3430000001 HC RX 343 DIAGNOSTIC RADIOPHARMACEUTICALS: Performed by: INTERNAL MEDICINE

## 2024-07-26 PROCEDURE — 93017 CV STRESS TEST TRACING ONLY: CPT

## 2024-07-26 PROCEDURE — A9502 TC99M TETROFOSMIN: HCPCS | Performed by: INTERNAL MEDICINE

## 2024-07-26 PROCEDURE — 78452 HT MUSCLE IMAGE SPECT MULT: CPT

## 2024-08-14 ENCOUNTER — HOSPITAL ENCOUNTER (OUTPATIENT)
Dept: RADIOLOGY | Facility: CLINIC | Age: 64
Discharge: HOME | End: 2024-08-14
Payer: MEDICARE

## 2024-08-14 DIAGNOSIS — Z13.6 SCREENING FOR CARDIOVASCULAR CONDITION: ICD-10-CM

## 2024-08-14 DIAGNOSIS — I10 PRIMARY HYPERTENSION: ICD-10-CM

## 2024-08-14 PROCEDURE — 75571 CT HRT W/O DYE W/CA TEST: CPT

## 2024-08-15 DIAGNOSIS — E78.2 HYPERLIPIDEMIA, MIXED: ICD-10-CM

## 2024-08-15 PROBLEM — R93.1 ABNORMAL SCREENING CARDIAC CT: Status: ACTIVE | Noted: 2024-08-15

## 2024-08-15 RX ORDER — ROSUVASTATIN CALCIUM 10 MG/1
10 TABLET, COATED ORAL 2 TIMES WEEKLY
Qty: 24 TABLET | Refills: 3 | Status: SHIPPED | OUTPATIENT
Start: 2024-08-15 | End: 2025-09-19

## 2024-08-15 NOTE — TELEPHONE ENCOUNTER
Relayed to patient   Verbally understands.     She is willing to take statin and repeat labs in 6 weeks  All ordered/formatted

## 2024-08-15 NOTE — TELEPHONE ENCOUNTER
----- Message from Kimberly Charles sent at 8/15/2024  2:33 PM EDT -----  There is the start of plaque build up on the heart arteries (and likely all arteries in body, ie brain, carotids etc)  Goal LDL <100  Would recommend low dose statin to achieve that  Crestor 10mg 2x/week  Lipid and ALT in 6 weeks  Order all if agreeable  Can offer VV if wishes to discuss further in person  Or can discuss at next scheduled appt

## 2024-10-24 ENCOUNTER — TELEPHONE (OUTPATIENT)
Dept: PRIMARY CARE | Facility: CLINIC | Age: 64
End: 2024-10-24

## 2024-10-24 ENCOUNTER — OFFICE VISIT (OUTPATIENT)
Dept: PRIMARY CARE | Facility: CLINIC | Age: 64
End: 2024-10-24
Payer: MEDICARE

## 2024-10-24 VITALS
HEART RATE: 71 BPM | BODY MASS INDEX: 25.47 KG/M2 | DIASTOLIC BLOOD PRESSURE: 69 MMHG | OXYGEN SATURATION: 97 % | TEMPERATURE: 97.6 F | SYSTOLIC BLOOD PRESSURE: 132 MMHG | WEIGHT: 157.8 LBS

## 2024-10-24 DIAGNOSIS — J06.9 UPPER RESPIRATORY TRACT INFECTION, UNSPECIFIED TYPE: Primary | ICD-10-CM

## 2024-10-24 LAB — POC RAPID STREP: NEGATIVE

## 2024-10-24 PROCEDURE — 87880 STREP A ASSAY W/OPTIC: CPT | Performed by: NURSE PRACTITIONER

## 2024-10-24 PROCEDURE — 3078F DIAST BP <80 MM HG: CPT | Performed by: NURSE PRACTITIONER

## 2024-10-24 PROCEDURE — 1159F MED LIST DOCD IN RCRD: CPT | Performed by: NURSE PRACTITIONER

## 2024-10-24 PROCEDURE — 87637 SARSCOV2&INF A&B&RSV AMP PRB: CPT

## 2024-10-24 PROCEDURE — 1036F TOBACCO NON-USER: CPT | Performed by: NURSE PRACTITIONER

## 2024-10-24 PROCEDURE — 99213 OFFICE O/P EST LOW 20 MIN: CPT | Performed by: NURSE PRACTITIONER

## 2024-10-24 PROCEDURE — 1160F RVW MEDS BY RX/DR IN RCRD: CPT | Performed by: NURSE PRACTITIONER

## 2024-10-24 PROCEDURE — G2211 COMPLEX E/M VISIT ADD ON: HCPCS | Performed by: NURSE PRACTITIONER

## 2024-10-24 PROCEDURE — 1123F ACP DISCUSS/DSCN MKR DOCD: CPT | Performed by: NURSE PRACTITIONER

## 2024-10-24 PROCEDURE — 3075F SYST BP GE 130 - 139MM HG: CPT | Performed by: NURSE PRACTITIONER

## 2024-10-24 RX ORDER — AMOXICILLIN AND CLAVULANATE POTASSIUM 875; 125 MG/1; MG/1
875 TABLET, FILM COATED ORAL 2 TIMES DAILY
Qty: 14 TABLET | Refills: 0 | Status: SHIPPED | OUTPATIENT
Start: 2024-10-24 | End: 2024-10-31

## 2024-10-24 ASSESSMENT — PATIENT HEALTH QUESTIONNAIRE - PHQ9
1. LITTLE INTEREST OR PLEASURE IN DOING THINGS: NOT AT ALL
SUM OF ALL RESPONSES TO PHQ9 QUESTIONS 1 AND 2: 0
2. FEELING DOWN, DEPRESSED OR HOPELESS: NOT AT ALL

## 2024-10-24 ASSESSMENT — ENCOUNTER SYMPTOMS: SINUS COMPLAINT: 1

## 2024-10-24 NOTE — PROGRESS NOTES
"Problem List Items Addressed This Visit    None  Visit Diagnoses       Upper respiratory tract infection, unspecified type    -  Primary    rapid strep neg  swabs sent  ok to start augmentin now  prn fu io    Relevant Medications    amoxicillin-pot clavulanate (Augmentin) 875-125 mg tablet    Other Relevant Orders    POCT Rapid Strep A manually resulted (Completed)    Sars-CoV-2 PCR    RSV PCR    Influenza A, and B PCR             Subjective   Patient ID: Krystal Angulo is a 64 y.o. female who presents for Sinus Problem (Head and face hurt /Sinus congestion /At least day 5 of Sx's ).  Sinus Problem      No fever  Cough HS mostly  No sob or wheeze  R ear pain  Feels drainage  No sore throat    Review of Systems   All other systems reviewed and are negative.      BP Readings from Last 3 Encounters:   10/24/24 132/69   07/17/24 123/79   07/11/24 106/72      Wt Readings from Last 3 Encounters:   10/24/24 71.6 kg (157 lb 12.8 oz)   07/17/24 69.9 kg (154 lb)   07/11/24 70.4 kg (155 lb 4 oz)      BMI:   Estimated body mass index is 25.47 kg/m² as calculated from the following:    Height as of 7/17/24: 1.676 m (5' 6\").    Weight as of this encounter: 71.6 kg (157 lb 12.8 oz).    Objective   Physical Exam  Constitutional:       General: She is not in acute distress.  HENT:      Head: Normocephalic and atraumatic.      Right Ear: Tympanic membrane and external ear normal.      Left Ear: Tympanic membrane and external ear normal.      Nose: Nose normal.      Mouth/Throat:      Mouth: Mucous membranes are moist.      Pharynx: Oropharyngeal exudate and posterior oropharyngeal erythema present.   Eyes:      Extraocular Movements: Extraocular movements intact.      Conjunctiva/sclera: Conjunctivae normal.   Cardiovascular:      Rate and Rhythm: Normal rate and regular rhythm.      Pulses: Normal pulses.   Pulmonary:      Effort: Pulmonary effort is normal.      Breath sounds: Normal breath sounds. "   Musculoskeletal:         General: Normal range of motion.      Cervical back: Normal range of motion and neck supple.   Skin:     General: Skin is warm and dry.   Neurological:      General: No focal deficit present.      Mental Status: She is alert.   Psychiatric:         Mood and Affect: Mood normal.

## 2024-10-25 LAB
FLUAV RNA RESP QL NAA+PROBE: NOT DETECTED
FLUBV RNA RESP QL NAA+PROBE: NOT DETECTED
RSV RNA RESP QL NAA+PROBE: NOT DETECTED
SARS-COV-2 RNA RESP QL NAA+PROBE: NOT DETECTED

## 2025-01-21 ENCOUNTER — TELEPHONE (OUTPATIENT)
Dept: PRIMARY CARE | Facility: CLINIC | Age: 65
End: 2025-01-21
Payer: MEDICARE

## 2025-01-21 NOTE — TELEPHONE ENCOUNTER
Pt called end of day states she woke up today with a crusty eye asking for tobrex states she has her annual with you Thursday morning at 9. I looked and do not see that med in her list   please advise

## 2025-01-22 ENCOUNTER — LAB (OUTPATIENT)
Dept: LAB | Facility: LAB | Age: 65
End: 2025-01-22
Payer: MEDICARE

## 2025-01-22 DIAGNOSIS — E55.9 VITAMIN D DEFICIENCY: ICD-10-CM

## 2025-01-22 DIAGNOSIS — E78.2 HYPERLIPIDEMIA, MIXED: ICD-10-CM

## 2025-01-22 DIAGNOSIS — F41.9 ANXIETY: ICD-10-CM

## 2025-01-22 LAB
25(OH)D3 SERPL-MCNC: 43 NG/ML (ref 30–100)
ALBUMIN SERPL BCP-MCNC: 4.3 G/DL (ref 3.4–5)
ALP SERPL-CCNC: 81 U/L (ref 33–136)
ALT SERPL W P-5'-P-CCNC: 9 U/L (ref 7–45)
ANION GAP SERPL CALC-SCNC: 9 MMOL/L (ref 10–20)
APPEARANCE UR: CLEAR
AST SERPL W P-5'-P-CCNC: 14 U/L (ref 9–39)
BASOPHILS # BLD AUTO: 0.05 X10*3/UL (ref 0–0.1)
BASOPHILS NFR BLD AUTO: 0.8 %
BILIRUB SERPL-MCNC: 0.5 MG/DL (ref 0–1.2)
BILIRUB UR STRIP.AUTO-MCNC: NEGATIVE MG/DL
BUN SERPL-MCNC: 15 MG/DL (ref 6–23)
CALCIUM SERPL-MCNC: 9.2 MG/DL (ref 8.6–10.3)
CHLORIDE SERPL-SCNC: 106 MMOL/L (ref 98–107)
CHOLEST SERPL-MCNC: 193 MG/DL (ref 0–199)
CHOLESTEROL/HDL RATIO: 3.8
CO2 SERPL-SCNC: 29 MMOL/L (ref 21–32)
COLOR UR: YELLOW
CREAT SERPL-MCNC: 0.64 MG/DL (ref 0.5–1.05)
EGFRCR SERPLBLD CKD-EPI 2021: >90 ML/MIN/1.73M*2
EOSINOPHIL # BLD AUTO: 0.12 X10*3/UL (ref 0–0.7)
EOSINOPHIL NFR BLD AUTO: 2 %
ERYTHROCYTE [DISTWIDTH] IN BLOOD BY AUTOMATED COUNT: 12.4 % (ref 11.5–14.5)
GLUCOSE SERPL-MCNC: 91 MG/DL (ref 74–99)
GLUCOSE UR STRIP.AUTO-MCNC: NORMAL MG/DL
HCT VFR BLD AUTO: 41.2 % (ref 36–46)
HDLC SERPL-MCNC: 51.1 MG/DL
HGB BLD-MCNC: 13.5 G/DL (ref 12–16)
HOLD SPECIMEN: NORMAL
IMM GRANULOCYTES # BLD AUTO: 0.01 X10*3/UL (ref 0–0.7)
IMM GRANULOCYTES NFR BLD AUTO: 0.2 % (ref 0–0.9)
KETONES UR STRIP.AUTO-MCNC: NEGATIVE MG/DL
LDLC SERPL CALC-MCNC: 118 MG/DL
LEUKOCYTE ESTERASE UR QL STRIP.AUTO: NEGATIVE
LYMPHOCYTES # BLD AUTO: 1.72 X10*3/UL (ref 1.2–4.8)
LYMPHOCYTES NFR BLD AUTO: 28.8 %
MCH RBC QN AUTO: 31.3 PG (ref 26–34)
MCHC RBC AUTO-ENTMCNC: 32.8 G/DL (ref 32–36)
MCV RBC AUTO: 96 FL (ref 80–100)
MONOCYTES # BLD AUTO: 0.63 X10*3/UL (ref 0.1–1)
MONOCYTES NFR BLD AUTO: 10.5 %
NEUTROPHILS # BLD AUTO: 3.45 X10*3/UL (ref 1.2–7.7)
NEUTROPHILS NFR BLD AUTO: 57.7 %
NITRITE UR QL STRIP.AUTO: NEGATIVE
NON HDL CHOLESTEROL: 142 MG/DL (ref 0–149)
NRBC BLD-RTO: 0 /100 WBCS (ref 0–0)
PH UR STRIP.AUTO: 5.5 [PH]
PLATELET # BLD AUTO: 211 X10*3/UL (ref 150–450)
POTASSIUM SERPL-SCNC: 4.3 MMOL/L (ref 3.5–5.3)
PROT SERPL-MCNC: 7.4 G/DL (ref 6.4–8.2)
PROT UR STRIP.AUTO-MCNC: NEGATIVE MG/DL
RBC # BLD AUTO: 4.31 X10*6/UL (ref 4–5.2)
RBC # UR STRIP.AUTO: NEGATIVE /UL
SODIUM SERPL-SCNC: 140 MMOL/L (ref 136–145)
SP GR UR STRIP.AUTO: 1.03
TRIGL SERPL-MCNC: 121 MG/DL (ref 0–149)
TSH SERPL-ACNC: 3.32 MIU/L (ref 0.44–3.98)
UROBILINOGEN UR STRIP.AUTO-MCNC: NORMAL MG/DL
VLDL: 24 MG/DL (ref 0–40)
WBC # BLD AUTO: 6 X10*3/UL (ref 4.4–11.3)

## 2025-01-22 PROCEDURE — 82306 VITAMIN D 25 HYDROXY: CPT

## 2025-01-22 PROCEDURE — 81003 URINALYSIS AUTO W/O SCOPE: CPT

## 2025-01-22 PROCEDURE — 84443 ASSAY THYROID STIM HORMONE: CPT

## 2025-01-22 PROCEDURE — 80061 LIPID PANEL: CPT

## 2025-01-22 PROCEDURE — 85025 COMPLETE CBC W/AUTO DIFF WBC: CPT

## 2025-01-22 PROCEDURE — 80053 COMPREHEN METABOLIC PANEL: CPT

## 2025-01-22 NOTE — TELEPHONE ENCOUNTER
Left detailed vm for pt call office if you have any questions  You can use refresh eye drops until seen

## 2025-01-22 NOTE — TELEPHONE ENCOUNTER
Does she have an eye MD  Would have her call them for advice  We dont typcially prescribe eye abx w/o appt  Can offer appt with NP if eye MD unable to assist  Can be VV

## 2025-01-23 ENCOUNTER — APPOINTMENT (OUTPATIENT)
Dept: PRIMARY CARE | Facility: CLINIC | Age: 65
End: 2025-01-23
Payer: MEDICARE

## 2025-01-23 VITALS
HEIGHT: 66 IN | SYSTOLIC BLOOD PRESSURE: 93 MMHG | DIASTOLIC BLOOD PRESSURE: 55 MMHG | WEIGHT: 155 LBS | BODY MASS INDEX: 24.91 KG/M2 | TEMPERATURE: 98.2 F | HEART RATE: 70 BPM | OXYGEN SATURATION: 97 %

## 2025-01-23 DIAGNOSIS — Z13.9 ENCOUNTER FOR SCREENING INVOLVING SOCIAL DETERMINANTS OF HEALTH (SDOH): ICD-10-CM

## 2025-01-23 DIAGNOSIS — Z13.89 SCREENING FOR MULTIPLE CONDITIONS: ICD-10-CM

## 2025-01-23 DIAGNOSIS — M72.2 PLANTAR FASCIITIS: ICD-10-CM

## 2025-01-23 DIAGNOSIS — Z78.0 MENOPAUSE: ICD-10-CM

## 2025-01-23 DIAGNOSIS — E78.2 HYPERLIPIDEMIA, MIXED: ICD-10-CM

## 2025-01-23 DIAGNOSIS — Z71.89 CARDIAC RISK COUNSELING: ICD-10-CM

## 2025-01-23 DIAGNOSIS — F32.0 CURRENT MILD EPISODE OF MAJOR DEPRESSIVE DISORDER WITHOUT PRIOR EPISODE (CMS-HCC): ICD-10-CM

## 2025-01-23 DIAGNOSIS — E03.9 ACQUIRED HYPOTHYROIDISM: ICD-10-CM

## 2025-01-23 DIAGNOSIS — Z71.89 ADVANCED CARE PLANNING/COUNSELING DISCUSSION: ICD-10-CM

## 2025-01-23 DIAGNOSIS — M79.10 MYALGIA: ICD-10-CM

## 2025-01-23 DIAGNOSIS — F41.9 ANXIETY: ICD-10-CM

## 2025-01-23 DIAGNOSIS — Z12.31 ENCOUNTER FOR SCREENING MAMMOGRAM FOR BREAST CANCER: ICD-10-CM

## 2025-01-23 DIAGNOSIS — R93.1 ABNORMAL SCREENING CARDIAC CT: ICD-10-CM

## 2025-01-23 DIAGNOSIS — Z12.11 SCREENING FOR COLON CANCER: ICD-10-CM

## 2025-01-23 DIAGNOSIS — Z00.00 ROUTINE ADULT HEALTH MAINTENANCE: Primary | ICD-10-CM

## 2025-01-23 DIAGNOSIS — I10 PRIMARY HYPERTENSION: ICD-10-CM

## 2025-01-23 DIAGNOSIS — E55.9 VITAMIN D DEFICIENCY: ICD-10-CM

## 2025-01-23 PROBLEM — R53.83 FATIGUE: Status: RESOLVED | Noted: 2024-07-17 | Resolved: 2025-01-23

## 2025-01-23 PROBLEM — N95.1 SYMPTOMATIC MENOPAUSAL OR FEMALE CLIMACTERIC STATES: Status: RESOLVED | Noted: 2024-01-18 | Resolved: 2025-01-23

## 2025-01-23 PROBLEM — Z12.4 SCREENING FOR CERVICAL CANCER: Status: RESOLVED | Noted: 2024-01-18 | Resolved: 2025-01-23

## 2025-01-23 PROBLEM — Z13.39 ALCOHOL SCREENING: Status: ACTIVE | Noted: 2025-01-23

## 2025-01-23 PROCEDURE — 1160F RVW MEDS BY RX/DR IN RCRD: CPT | Performed by: INTERNAL MEDICINE

## 2025-01-23 PROCEDURE — 3074F SYST BP LT 130 MM HG: CPT | Performed by: INTERNAL MEDICINE

## 2025-01-23 PROCEDURE — 99214 OFFICE O/P EST MOD 30 MIN: CPT | Performed by: INTERNAL MEDICINE

## 2025-01-23 PROCEDURE — 3008F BODY MASS INDEX DOCD: CPT | Performed by: INTERNAL MEDICINE

## 2025-01-23 PROCEDURE — 3078F DIAST BP <80 MM HG: CPT | Performed by: INTERNAL MEDICINE

## 2025-01-23 PROCEDURE — 1159F MED LIST DOCD IN RCRD: CPT | Performed by: INTERNAL MEDICINE

## 2025-01-23 PROCEDURE — 1036F TOBACCO NON-USER: CPT | Performed by: INTERNAL MEDICINE

## 2025-01-23 PROCEDURE — G0446 INTENS BEHAVE THER CARDIO DX: HCPCS | Performed by: INTERNAL MEDICINE

## 2025-01-23 PROCEDURE — G2211 COMPLEX E/M VISIT ADD ON: HCPCS | Performed by: INTERNAL MEDICINE

## 2025-01-23 PROCEDURE — 1123F ACP DISCUSS/DSCN MKR DOCD: CPT | Performed by: INTERNAL MEDICINE

## 2025-01-23 PROCEDURE — 99497 ADVNCD CARE PLAN 30 MIN: CPT | Performed by: INTERNAL MEDICINE

## 2025-01-23 PROCEDURE — G0438 PPPS, INITIAL VISIT: HCPCS | Performed by: INTERNAL MEDICINE

## 2025-01-23 RX ORDER — PREDNISONE 10 MG/1
1 TABLET ORAL
COMMUNITY
Start: 2024-03-21 | End: 2025-01-23 | Stop reason: WASHOUT

## 2025-01-23 RX ORDER — ALBUTEROL SULFATE 90 UG/1
2 INHALANT RESPIRATORY (INHALATION) EVERY 4 HOURS PRN
Qty: 18 G | Refills: 2 | Status: SHIPPED | OUTPATIENT
Start: 2025-01-23 | End: 2026-01-23

## 2025-01-23 RX ORDER — ROSUVASTATIN CALCIUM 10 MG/1
10 TABLET, COATED ORAL 2 TIMES WEEKLY
Qty: 24 TABLET | Refills: 3 | Status: SHIPPED | OUTPATIENT
Start: 2025-01-23 | End: 2026-02-27

## 2025-01-23 RX ORDER — SERTRALINE HYDROCHLORIDE 50 MG/1
50 TABLET, FILM COATED ORAL DAILY
Qty: 90 TABLET | Refills: 3 | Status: SHIPPED | OUTPATIENT
Start: 2025-01-23

## 2025-01-23 RX ORDER — BROMPHENIRAMINE MALEATE, PSEUDOEPHEDRINE HYDROCHLORIDE, AND DEXTROMETHORPHAN HYDROBROMIDE 2; 30; 10 MG/5ML; MG/5ML; MG/5ML
5 SYRUP ORAL
COMMUNITY
Start: 2024-03-21 | End: 2025-01-23 | Stop reason: WASHOUT

## 2025-01-23 SDOH — ECONOMIC STABILITY: FOOD INSECURITY: WITHIN THE PAST 12 MONTHS, YOU WORRIED THAT YOUR FOOD WOULD RUN OUT BEFORE YOU GOT MONEY TO BUY MORE.: NEVER TRUE

## 2025-01-23 SDOH — ECONOMIC STABILITY: INCOME INSECURITY: IN THE LAST 12 MONTHS, WAS THERE A TIME WHEN YOU WERE NOT ABLE TO PAY THE MORTGAGE OR RENT ON TIME?: NO

## 2025-01-23 SDOH — ECONOMIC STABILITY: FOOD INSECURITY: WITHIN THE PAST 12 MONTHS, THE FOOD YOU BOUGHT JUST DIDN'T LAST AND YOU DIDN'T HAVE MONEY TO GET MORE.: NEVER TRUE

## 2025-01-23 SDOH — ECONOMIC STABILITY: TRANSPORTATION INSECURITY
IN THE PAST 12 MONTHS, HAS LACK OF TRANSPORTATION KEPT YOU FROM MEETINGS, WORK, OR FROM GETTING THINGS NEEDED FOR DAILY LIVING?: NO

## 2025-01-23 SDOH — ECONOMIC STABILITY: TRANSPORTATION INSECURITY
IN THE PAST 12 MONTHS, HAS THE LACK OF TRANSPORTATION KEPT YOU FROM MEDICAL APPOINTMENTS OR FROM GETTING MEDICATIONS?: NO

## 2025-01-23 ASSESSMENT — MINI MENTAL STATE EXAM
PLACE DESIGN, ERASER AND PENCIL IN FRONT OF THE PERSON.  SAY:  COPY THIS DESIGN PLEASE.  SHOW: DESIGN. ALLOW: MULTIPLE TRIES. WAIT UNTIL PERSON IS FINISHED AND HANDS IT BACK. SCORE: ONLY FOR DIAGRAM WITH 4-SIDED FIGURE BETWEEN TWO 5-SIDED FIGURES: 1 CORRECT
SPELL THE WORD WORLD FORWARD AND BACKWARDS OR SERIAL 7S: 5 CORRECT
WHAT IS THE YEAR, SEASON, DATE, DAY, AND MONTH: 5 CORRECT
SAY: I WOULD LIKE YOU TO REPEAT THIS PHRASE AFTER ME: NO IFS, ANDS, OR BUTS.: 1 CORRECT
WHAT STATE, COUNTRY, CITY, HOSPITAL, FLOOR: 5 CORRECT
NAME OR REPEAT 3 OBJECTS - (APPLE, TABLE, PENNY) OR (BALL, TREE, FLAG): 3 CORRECT
SAY:  READ THE WORDS ON THE PAGE AND THEN DO WHAT IT SAYS.  THEN HAND THE PERSON THE SHEET WITH CLOSE YOUR EYES ON IT.  IF THE SUBJECT READS AND DOES NOT CLOSE THEIR EYES, REPEAT UP TO THREE TIMES.  SCORE ONLY IF SUBJECT CLOSES EYES.: 3 CORRECT
HAND THE PERSON A PENCIL AND PAPER. SAY:  WRITE ANY COMPLETE SENTENCE ON THAT PIECE OF PAPER. (NOTE: THE SENTENCE MUST MAKE SENSE.  IGNORE SPELLING ERRORS): 1 CORRECT
SUM ALL MMSE QUESTIONS FOR TOTAL SCORE [OUT OF 30].: 30
SHOW: PENCIL [OBJECT] ASK: WHAT IS THIS CALLED?: 2 CORRECT
PLEASE COPY THIS PICTURE (NOTE ALL 10 ANGLES MUST BE PRESENT AND TWO MUST INTERSECT): 1 CORRECT
RECALL THE 3 OBJECTS FROM ABOVE (APPLE, TABLE, PENNY) OR (BALL, TREE, FLAG): 3 CORRECT

## 2025-01-23 ASSESSMENT — SOCIAL DETERMINANTS OF HEALTH (SDOH): IN THE PAST 12 MONTHS, HAS THE ELECTRIC, GAS, OIL, OR WATER COMPANY THREATENED TO SHUT OFF SERVICE IN YOUR HOME?: NO

## 2025-01-23 NOTE — ASSESSMENT & PLAN NOTE
Annual Wellness exam completed   Preventive Health History reviewed  Ordered:   Labs    Mammogram   BMD   Cologuard  Shingrix and Pneumonia vaccine (21) at [facility]

## 2025-01-23 NOTE — PATIENT INSTRUCTIONS
"Genetic evaluation is NOT a tool to diagnose dementia.  Dementia is a term that describes a group of symptoms associated with cognitive and/or behavioral changes that interfere with the ability to perform everyday activities.   The core mental functions that may be affected in individuals with dementia include memory, communication, personality, behavior, reasoning, judgement, and visual perception.   Alzheimer's disease (AD) and frontotemporal dementia (FTD) are the two most common types of dementia.      Every person has a baseline 10% to 15% risk of developing Alzheimer's during their lifetime.   Factors such as family history and diet, exercise, drinking, and smoking habits can increase that number.  A recent study showed that having any first-degree relative with Alzheimer's significantly increased risk (PMID: 50686742):  >=1 first-degree relative: RR 1.73   >=2 first-degree relatives: RR 3.98   >=3 first-degree relatives: RR 2.48   >=4 first-degree relatives: RR 14.77   Having one first degree and one second degree relative has a RR of 2.04.     Most cases of dementia, including late-onset Alzheimer's disease (diagnosed after 65 years of age), have MULTIFACTORIAL INHERITANCE without testable genes.   We reviewed multifactorial inheritance.      We reviewed \"familial early-onset AD\" which can be caused by mutations in RAGHAVENDRA, PSEN1, or PSEN2 (5-10% of early onset AD) (PMID: 91693242).   Individuals with familial early-onset AD have dementia before 60-65 years of age.   The lifetime risk of dementia when having mutations in these genes is as high as 100%.       We reviewed \"familial late-onset AD,\" among which 60% of the patients have a genetic variant in the APOE gene called APOE4.   Having one e4 allele would increase the lifetime risk of AD from 10%-15% (baseline) to 15%-25%.   APOE4 homozygotes (rare) have a lifetime risk of developing AD of 40%-45% by age 75 years in female and 25%-30% by age 80 in male.    "   We reviewed the limitations of the APOE testing:   APOE testing has LIMITED clinical utility and predictive value.   Even if positive, there is no change in clinical management and the person may not develop clinical dementia.   There is no prevention.   Having a positive result may impact insurability.   Her risk based on having a family member with dementia/Alzheimer's is not significantly different than what her risk would be if she has an e4 allele.  Insurance will not pay for the test given that it is not recommended.  Testing of this gene is NOT recommended by professional organizations, such as the American College of Medical Genetics (ACMG).  According to [facility], if an individual's clinical and family history are not suggestive of a single gene cause of dementia, no further genetics workup is recommended.      We discussed Genetic Information Non-discrimination Act (CORDELIA).  CORDELIA prohibits discrimination by health insurance plans and employers based on one's genetic information.  CORDELIA does not apply to life, long-term care or disability insurance. These insurers are allowed to use genetic, personal or family health information to make coverage or premium decisions.   Some states have their own genetic protection laws, providing additional security against genetic discrimination for these types of insurance.  In addition, CORDELIA does not apply to:  Members of the [place]   Veterans obtaining health care through the [employer]  Individuals using the [facility], or  Federal employees enrolled in the Federal Employees Health Benefits program (FEHB)

## 2025-01-23 NOTE — PROGRESS NOTES
Annual Medicare Wellness Exam/Comprehensive Problem Focused Follow Up  HPI/CC  Chief Complaint   Patient presents with    Annual Exam    Medicare Annual Wellness Visit Subsequent     Reviewed chart for care received since last appointment.  Started statin last year  CT calcium score reviewed, 4  Nuclear stress test 7/24:   Impression   No evidence of inducible myocardial ischemia or prior infarction.  The left ventricle is normal in size.  Normal LV wall motion with a post-stress LV EF estimated at greater than 65%     C/o hip pain and legs bother her  Ache all the time, end of day  Feet hurt when gets out of bed  Bottom of feet  Gets better as walks around    Lasb reviewed:  Component      Latest Ref Rng 1/22/2025   WBC      4.4 - 11.3 x10*3/uL 6.0    nRBC      0.0 - 0.0 /100 WBCs 0.0    RBC      4.00 - 5.20 x10*6/uL 4.31    HEMOGLOBIN      12.0 - 16.0 g/dL 13.5    HEMATOCRIT      36.0 - 46.0 % 41.2    MCV      80 - 100 fL 96    MCH      26.0 - 34.0 pg 31.3    MCHC      32.0 - 36.0 g/dL 32.8    RED CELL DISTRIBUTION WIDTH      11.5 - 14.5 % 12.4    Platelets      150 - 450 x10*3/uL 211    Neutrophils %      40.0 - 80.0 % 57.7    Immature Granulocytes %, Automated      0.0 - 0.9 % 0.2    Lymphocytes %      13.0 - 44.0 % 28.8    Monocytes %      2.0 - 10.0 % 10.5    Eosinophils %      0.0 - 6.0 % 2.0    Basophils %      0.0 - 2.0 % 0.8    Neutrophils Absolute      1.20 - 7.70 x10*3/uL 3.45    Immature Granulocytes Absolute, Automated      0.00 - 0.70 x10*3/uL 0.01    Lymphocytes Absolute      1.20 - 4.80 x10*3/uL 1.72    Monocytes Absolute      0.10 - 1.00 x10*3/uL 0.63    Eosinophils Absolute      0.00 - 0.70 x10*3/uL 0.12    Basophils Absolute      0.00 - 0.10 x10*3/uL 0.05    GLUCOSE      74 - 99 mg/dL 91    SODIUM      136 - 145 mmol/L 140    POTASSIUM      3.5 - 5.3 mmol/L 4.3    CHLORIDE      98 - 107 mmol/L 106    Bicarbonate      21 - 32 mmol/L 29    Anion Gap      10 - 20 mmol/L 9 (L)    Blood Urea  Nitrogen      6 - 23 mg/dL 15    Creatinine      0.50 - 1.05 mg/dL 0.64    EGFR      >60 mL/min/1.73m*2 >90    Calcium      8.6 - 10.3 mg/dL 9.2    Albumin      3.4 - 5.0 g/dL 4.3    Alkaline Phosphatase      33 - 136 U/L 81    Total Protein      6.4 - 8.2 g/dL 7.4    AST      9 - 39 U/L 14    Bilirubin Total      0.0 - 1.2 mg/dL 0.5    ALT      7 - 45 U/L 9    Color, Urine      Light-Yellow, Yellow, Dark-Yellow  Yellow    Appearance, Urine      Clear  Clear    Specific Gravity, Urine      1.005 - 1.035  1.027    pH, Urine      5.0, 5.5, 6.0, 6.5, 7.0, 7.5, 8.0  5.5    Protein, Urine      NEGATIVE, 10 (TRACE), 20 (TRACE) mg/dL NEGATIVE    Glucose, Urine      Normal mg/dL Normal    Blood, Urine      NEGATIVE  NEGATIVE    Ketones, Urine      NEGATIVE mg/dL NEGATIVE    Bilirubin, Urine      NEGATIVE  NEGATIVE    Urobilinogen, Urine      Normal mg/dL Normal    Nitrite, Urine      NEGATIVE  NEGATIVE    Leukocyte Esterase, Urine      NEGATIVE  NEGATIVE    CHOLESTEROL      0 - 199 mg/dL 193    HDL CHOLESTEROL      mg/dL 51.1    Cholesterol/HDL Ratio 3.8    LDL Calculated      <=99 mg/dL 118 (H)    VLDL      0 - 40 mg/dL 24    TRIGLYCERIDES      0 - 149 mg/dL 121    Non HDL Cholesterol      0 - 149 mg/dL 142    Vitamin D, 25-Hydroxy, Total      30 - 100 ng/mL 43    Thyroid Stimulating Hormone      0.44 - 3.98 mIU/L 3.32    Extra Tube Hold for add-ons.       Component      Latest Ref Rng 7/10/2024 1/22/2025   LDL Calculated      <=99 mg/dL 164 (H)  118 (H)    Does have leg aching  Started statin in 9/24    Assessment and Plan:  Problem List Items Addressed This Visit          High    Routine adult health maintenance - Primary    Overview     Influenza Vaccine, Split-Non Spec10/22/18 , 10/21/22  Pfizer COVID 19 vaccine 3/25/21, 4/20/21, 12/24/21   TD Adult2/21/2005   Tdap (Age 7+)6/10/2011; 10/21/2022  PAP/HPV 2013 (-); 10/22/18 (-); 1/2024   Cscope 2012 (10yrs)  Mamm 9/26/18, 3/8/24  Occult stool 1/2024 (-)  BMD 3/2024  (normal)  CT Calcium score= 4 8/15/24  MMSE 1/23/25 : 30/30         Current Assessment & Plan     Annual Wellness exam completed   Preventive Health History reviewed  Ordered:   Labs    Mammogram   BMD   Cologuard  Shingrix and Pneumonia vaccine (21) at [facility]             Relevant Medications    albuterol 90 mcg/actuation inhaler    pneumo 21-lissa conj-dip crm,PF, 0.5 mL syringe    zoster vaccine-recombinant adjuvanted (Shingrix) 50 mcg/0.5 mL vaccine       Medium    Abnormal screening cardiac CT    Overview     8/15/24: Coronary artery calcium score of 4*.   KURTZ 52nd percentile for age, gender, and race in asymptomatic patients.   at the time  On statin         Current Assessment & Plan     Increase to 3x/week         Advanced care planning/counseling discussion    Overview     1/23/25: Reminded to do forms  Intending for daughter, Shannon, to be her POA pending final discussion/decision  Full code          Anxiety    Overview     On Zoloft  Failed Buspar, Effexor, and Paxil (AE)         Relevant Medications    sertraline (Zoloft) 50 mg tablet    Cardiac risk counseling    Overview     1/23/25: ASCVD risk 6.6% borderline risk   ASA not indicated with present guidelines          Current mild episode of major depressive disorder without prior episode (CMS-HCC)    Overview     On Zoloft (25-50mg)  Failed Buspar, Effexor and Paxil (AE)           Encounter for screening involving social determinants of health (SDoH)    Overview     01/23/25: 5 minutes spent on SDOH screening.  Specifically: Housing, Food Insecurity, Utilities and Transportatin Needs were evaluated   (See Screenings in Rooming section for documentation)           Encounter for screening mammogram for breast cancer    Relevant Orders    BI mammo bilateral screening tomosynthesis    Hyperlipidemia, mixed    Overview     8/15/24: Coronary artery calcium score of 4*.   KURTZ 52nd percentile for age, gender, and race in asymptomatic patients.    "at the time  Goal LDL <100  On statin         Current Assessment & Plan     Increase statin to 3x/week if tolerates         Relevant Medications    rosuvastatin (Crestor) 10 mg tablet    Other Relevant Orders    Comprehensive Metabolic Panel    CBC and Auto Differential    Lipid Panel    Alanine Aminotransferase    Lipid Panel    Hypertension    Overview     7/15/22: hers 127/74 hr 75 ours 121/74 hr 76  S/p norvasc  Has seen Dr Flannery   Currently controlled without antihypertensive. Will continue to monitor  1/18/24: IO EKG done         Relevant Orders    Urinalysis with Reflex Microscopic    Albumin-Creatinine Ratio, Urine Random    RESOLVED: Hypothyroidism    Relevant Orders    TSH with reflex to Free T4 if abnormal    Menopause    Relevant Orders    XR DEXA bone density    Screening for colon cancer    Relevant Orders    Cologuard® colon cancer screening    Screening for multiple conditions    Overview     01/23/25:Depression screening completed using the PHQ-2 questions with results documented in the chart/encounter  (See Rooming Screenings for documentation)           Vitamin D deficiency    Overview     8/8/22: 22Goal ~50;         Relevant Orders    Vitamin D 25-Hydroxy,Total (for eval of Vitamin D levels)    Vitamin B12     Other Visit Diagnoses       Myalgia        ? r/t statin vs other  Will stop for 3-4 weeks then resume  if due to that will change to zetia  if not due to that will work up other cuases  Try Ali    Relevant Orders    CK    Plantar fasciitis        Yon start with Raj Carrero/ramiro          ROS otherwise negative aside from what was mentioned above in HPI.    Vitals  BP 93/55   Pulse 70   Temp 36.8 °C (98.2 °F)   Ht 1.67 m (5' 5.75\")   Wt 70.3 kg (155 lb)   LMP  (LMP Unknown)   SpO2 97%   BMI 25.21 kg/m²   Body mass index is 25.21 kg/m².  Physical Exam  Gen: Alert, NAD  HEENT:  Unremarkable  Neck:  No NIK  Respiratory:  Lungs CTAB  Cardiovascular:  Heart RRR  Neuro:  Gross motor and " sensory intact  Skin:  No suspicious lesions present  Breast: No masses, or axillary lymphadenopathy  Gyn: Normal pelvic exam: no uterine masses or cervical lesions, or CMT; no vaginal D/C. No ovarian or adnexal masses; No external vaginal or anal/perineal lesions (Pt declined chaperone)    MMSE: 30/30    Patient Care Team:  Kimberly Charles MD as PCP - General (Internal Medicine)  [facility]  Tigre Flannery MD as Consulting Physician (Cardiology)  Rohan Patterson MD as Consulting Physician (Rheumatology)       Health Risk Assessment:  Patient was asked if he/she has any difficulty performing the following activities of daily living:  Preparing nutritious food and eating? No  Grocery shopping? No  Bathing and grooming yourself? No  Getting dressed? No  Using the toilet?No  Using the phone? No  Moving around from place to place (physical ambulation)? No  Doing housework (including laundry) independently? No  Managing finances independently? No  Managing medications independently? No  Doing housework (including laundry) independently? No    Patient was asked if he/she:  Feels safe in current home environment?: Yes  Uses seatbelt? Yes  Sees the dentist regularly? Yes  Exercises regularly: Yes  Suffers from depression, stress, anger, loneliness or social isolation, pain, suicidality? No    Dietary issues discussed: Yes  Cognitive Impairment No  Hearing difficulties: No  Visual Acuity assessed: No    What is your self-assessment of overall health status and life satisfaction? Good     5 minutes spent on SDOH screening:   Specifically Housing, Food Insecurity, Utilities and Transportatin Needs were evaluated   (See Screenings in Rooming section for documentation)  Food Insecurity: No Food Insecurity (1/23/2025)    Hunger Vital Sign     Worried About Running Out of Food in the Last Year: Never true     Ran Out of Food in the Last Year: Never true     Housing Stability:  Unknown (1/23/2025)    Housing Stability Vital Sign     Unable to Pay for Housing in the Last Year: No     Number of Times Moved in the Last Year: Not on file     Homeless in the Last Year: No     Transportation Needs: No Transportation Needs (1/23/2025)    PRAPARE - Transportation     Lack of Transportation (Medical): No     Lack of Transportation (Non-Medical): No         Allergies and Medications  Aspirin, Azithromycin, Buspirone, Darvocet a500 [propoxyphene n-acetaminophen], Paroxetine, and Propoxyphene  Current Outpatient Medications   Medication Instructions    albuterol 90 mcg/actuation inhaler 2 puffs, inhalation, Every 4 hours PRN    cholecalciferol (Vitamin D-3) 125 MCG (5000 UT) capsule 1 capsule, Daily    cyanocobalamin (Vitamin B-12) 1,000 mcg tablet 1 tablet, Daily    pneumo 21-lissa conj-dip crm,PF, 0.5 mL syringe 0.5 mL, intramuscular, Once    rosuvastatin (CRESTOR) 10 mg, oral, 2 times weekly    sertraline (ZOLOFT) 50 mg, oral, Daily    vitamin E 45 mg (100 unit) capsule 1 capsule, Daily    zoster vaccine-recombinant adjuvanted (Shingrix) 50 mcg/0.5 mL vaccine 0.5 mL, intramuscular, Once       Medications and Supplements  prescribed by me and other practitioners or clinical pharmacist (such as prescriptions, OTC's, herbal therapies and supplements) were reviewed and documented in the medical record.      Active Problem List  Patient Active Problem List   Diagnosis    Routine adult health maintenance    BMI 25.0-25.9,adult    Anxiety    Arthritis    Cervical spondylosis with radiculopathy    H/O fracture    Current mild episode of major depressive disorder without prior episode (CMS-[facility])    Diverticulosis of colon    Hyperlipidemia, mixed    Hypertension    Degeneration of intervertebral disc of cervical region    Degeneration of intervertebral disc of lumbar region    Lumbar spondylosis    Multiple premature ventricular complexes    AMIRAH (stress urinary incontinence, female)    Urinary incontinence in  female    Vitamin D deficiency    Advanced care planning/counseling discussion    Cardiac risk counseling    Screening for osteoporosis    Encounter for screening mammogram for malignant neoplasm of breast    Encounter for screening for malignant neoplasm of colon    SAMIA positive    Carpal tunnel syndrome of left wrist    Abnormal screening cardiac CT    Alcohol screening    Encounter for screening involving social determinants of health (SDoH)    Screening for multiple conditions    Menopause    Encounter for screening mammogram for breast cancer    Screening for colon cancer       Comprehensive Medical/Surgical/Social/Family History  Past Medical History:   Diagnosis Date    Abnormal screening cardiac CT 08/15/2024    1. Coronary artery calcium score of 4*. 2. KURTZ 52nd percentile** for age, gender, and race in asymptomatic patients.    H/O bone density study 02/2024    normal    H/O cardiovascular stress test 08/07/2020    3/13: The exercise stress echo was negative for ischemia at 100 % of MPHR (11.1 METS). Good functional capacity for age and gender. - The left ventricle is normal in size. Left ventricular systolic function is normal. EF = 66  5% (2D biplane)    H/O chest x-ray     7/22: normal 3/18 (-) 5/19: ribs & chest neg 2/18/16 (-)    H/O chest x-ray 01/08/2024    bilat ribs & visualized chest normal    H/O CT scan of brain     7/22: normal    H/O diagnostic ultrasound     7/20: Renal US (-)    H/O echocardiogram     8/12/22: 1. The left ventricular systolic function is normal with a 60-65% estimated ejection fraction. 2. RVSP within normal limits.    H/O magnetic resonance imaging of cervical spine     2010: disc bulging with spurring C4-5-6, C6-7    H/O magnetic resonance imaging of lumbar spine     010: L2 disc herniation, bulging discs L3, L4 8/20: Multilevel spondylosis. Disc osteophyte complex with superimposed laeft paracentral herniation at L4-5 causeing severe left foraminal, moderate centra  canal and right neural foraminal stenosis. Disc osteophyte complex at L3-4 and L5-S1 causing mild to moderate neural foraminal and central canal stenosis. Disc bulge at L1-3    H/O pelvic ultrasound     ; Dr García; intramural fibroid    H/O pelvic ultrasound     ; fibroid    History of cardiovascular stress test 2024    No evidence of inducible myocardial ischemia or prior infarction.    The left ventricle is normal in size.    Normal LV wall motion with a post-stress LV EF estimated at greater than 65%.    History of Holter monitoring     :  HR , avg 82. SVE burden 0.03%, max count per 24 hours= 30 PVC burden 0.03%, max count per 24hours =39, 2 disparate morphologies SVT: 9 events, longest 28 beats, fastest event 116 BPM VT: 1 event, 3 beats, 169BPM Pt recorded 9 events : 1. The basic rhythm was sinus, ranging from 54 to 152 beats per minute, with an average of 87 beats per minute.  2. Rare isolated PVCs/PACs     Past Surgical History:   Procedure Laterality Date    CARPAL TUNNEL RELEASE  2018    Neuroplasty Decompression Median Nerve At Carpal Tunnel    COLONOSCOPY  10/22/2018    2012: Multiple large mouth diverticuli in sigmoid    DILATION AND CURETTAGE OF UTERUS  2018    Dilation And Curettage    LAPAROSCOPY DIAGNOSTIC / BIOPSY / ASPIRATION / LYSIS  2018    Laparoscopy (Diagnostic)    VARICOSE VEIN SURGERY  2018    Varicose Vein Ligation     Social History     Social History Narrative    Social History    3 kids,      Retired    Nonsmoker    Social ETOH: 4/week    occasional marijuana use    ---    Family History:    F: HTN, CAD/MI age 61, Prostate CA, NHL, Lung CA ( age 90)    M:  Arthritis /DJD, TKA bilaterally, Depression, back issues, Alzheimers Dementia, AFIB ( age 89)    B:  Hypertension                                                 S:  Anxiety/ BRYAN, Decreased Cognition?    D: Shannon Kaminski    Son:  Anxiety/Depression (Manish)     PGF:  Stroke                                           MGM:  Diabetes    PGM: Thyroid CA                                             Tobacco/Alcohol/Opioid use, as well as Illicit Drug Use was screened for/reviewed and documented in Social Documentation section of the chart and medication list as appropriate     Depression Screening  Depression screening completed using the PHQ-2 questions, with results documented in the chart/encounter (5 min spent on this).  (See Rooming Screening section for documentation, and/or progress note for additional information)    Cardiac Risk Assessment (15 minutes spent on this)  The 10-year ASCVD risk score (Bubba BASSETT, et al., 2019) is: 3.4%    Values used to calculate the score:      Age: 64 years      Sex: Female      Is Non- : No      Diabetic: No      Tobacco smoker: No      Systolic Blood Pressure: 93 mmHg      Is BP treated: No      HDL Cholesterol: 51.1 mg/dL      Total Cholesterol: 193 mg/dL    Cardiovascular risk was discussed and, if needed, lifestyle modifications recommended, including nutritional choices, exercise, and elimination of habits contributing to risk. We agreed on a plan to reduce the current cardiovascular risk based on above discussion as needed.     Aspirin use/disuse was discussed and documented in the Problem List of the medical record (under Cardiac Risk Counseling) after reviewing the updated guidelines below:  Consider low dose Aspirin ( mg) use if the benefit for cardiovascular disease prevention outweighs risk for bleeding complications.   Discussed that in general, low dose ASA should be considered:  In patients WITHOUT prior MI/stroke/PAD (primary prevention):   a. Age <60: Use if 10-year cardiovascular disease risk >20%, with discussion of risks and benefits with patient  b. Age 60-<70: Use if 10-year cardiovascular disease risk >20% and low bleeding (e.g., gastrointestinal) risk, with discussion of risks and benefits  with patient  c. Age >=70: Do not use    In patients WITH prior MI/stroke/PAD (secondary prevention):   Generally use unless extremely high bleeding (e.g., gastrointenstinal) risk, with discussion of risks and benefits with patient    Advance Directives Discussion  Advanced Care Planning (including a Living Will, Healthcare POA, as well as specific end of life choices and/or directives), was discussed with the patient and/or surrogate, voluntarily, and details of that discussion documented in the Problem List (under Advanced Directives Discussion) of the medical record.   (16 min spent discussing above)     During the course of the visit the patient was educated and counseled about age appropriate screening and preventive services.   Completed preventive screenings were documented in the chart (see Routine Health Maintenance in Problem List) and orders were placed for outstanding screenings/procedures as documented in the Assessment and Plan.    Patient Instructions (the written plan) was given to the patient at check out as part of the AVS.

## 2025-02-11 LAB — NONINV COLON CA DNA+OCC BLD SCRN STL QL: NEGATIVE

## 2025-03-21 ENCOUNTER — TELEPHONE (OUTPATIENT)
Dept: PRIMARY CARE | Facility: CLINIC | Age: 65
End: 2025-03-21
Payer: MEDICARE

## 2025-03-21 NOTE — TELEPHONE ENCOUNTER
Spoke with patient.  Patient tested for COVID 19 on Saturday, 3/15/2025. She stated she keeps testing positive and I explained that she will test positive for awhile.   Patient stated she was looking for medication and I explained she was out of the window for the Paxlovid. I explained it needed to be given with in the first 5 days of sx. She understood.  She stated she has headache, nasal congestion and cough.  I explained she should treat the sx: rotate Tylenol and Ibuprofen (or buy the Advil Dual action); use nasal spray and Delsym cough syrup.  Relayed to call office on Monday if things are not improving.  Patient verbally understand.  Please advise if anything else should be advised

## 2025-05-27 ENCOUNTER — HOSPITAL ENCOUNTER (EMERGENCY)
Facility: HOSPITAL | Age: 65
Discharge: STILL ADMITTED OR ADMINISTRATIVE | End: 2025-05-27
Payer: MEDICARE

## 2025-05-27 ENCOUNTER — APPOINTMENT (OUTPATIENT)
Dept: CARDIOLOGY | Facility: HOSPITAL | Age: 65
End: 2025-05-27
Payer: MEDICARE

## 2025-05-27 ENCOUNTER — APPOINTMENT (OUTPATIENT)
Dept: RADIOLOGY | Facility: HOSPITAL | Age: 65
End: 2025-05-27
Payer: MEDICARE

## 2025-05-27 ENCOUNTER — HOSPITAL ENCOUNTER (EMERGENCY)
Facility: HOSPITAL | Age: 65
Discharge: HOME | End: 2025-05-27
Attending: EMERGENCY MEDICINE
Payer: MEDICARE

## 2025-05-27 ENCOUNTER — TELEPHONE (OUTPATIENT)
Dept: PRIMARY CARE | Facility: CLINIC | Age: 65
End: 2025-05-27
Payer: MEDICARE

## 2025-05-27 VITALS
BODY MASS INDEX: 24.11 KG/M2 | WEIGHT: 150 LBS | DIASTOLIC BLOOD PRESSURE: 84 MMHG | HEART RATE: 72 BPM | OXYGEN SATURATION: 99 % | HEIGHT: 66 IN | TEMPERATURE: 98.2 F | RESPIRATION RATE: 18 BRPM | SYSTOLIC BLOOD PRESSURE: 172 MMHG

## 2025-05-27 DIAGNOSIS — K29.00 ACUTE GASTRITIS WITHOUT HEMORRHAGE, UNSPECIFIED GASTRITIS TYPE: Primary | ICD-10-CM

## 2025-05-27 LAB
ALBUMIN SERPL BCP-MCNC: 4.2 G/DL (ref 3.4–5)
ALP SERPL-CCNC: 71 U/L (ref 33–136)
ALT SERPL W P-5'-P-CCNC: 13 U/L (ref 7–45)
ANION GAP SERPL CALC-SCNC: 11 MMOL/L (ref 10–20)
APPEARANCE UR: CLEAR
AST SERPL W P-5'-P-CCNC: 15 U/L (ref 9–39)
ATRIAL RATE: 75 BPM
BASOPHILS # BLD AUTO: 0.04 X10*3/UL (ref 0–0.1)
BASOPHILS NFR BLD AUTO: 0.5 %
BILIRUB SERPL-MCNC: 0.4 MG/DL (ref 0–1.2)
BILIRUB UR STRIP.AUTO-MCNC: NEGATIVE MG/DL
BUN SERPL-MCNC: 12 MG/DL (ref 6–23)
CALCIUM SERPL-MCNC: 9.1 MG/DL (ref 8.6–10.3)
CARDIAC TROPONIN I PNL SERPL HS: 3 NG/L (ref 0–13)
CHLORIDE SERPL-SCNC: 105 MMOL/L (ref 98–107)
CO2 SERPL-SCNC: 27 MMOL/L (ref 21–32)
COLOR UR: COLORLESS
CREAT SERPL-MCNC: 0.56 MG/DL (ref 0.5–1.05)
EGFRCR SERPLBLD CKD-EPI 2021: >90 ML/MIN/1.73M*2
EOSINOPHIL # BLD AUTO: 0.07 X10*3/UL (ref 0–0.7)
EOSINOPHIL NFR BLD AUTO: 0.9 %
ERYTHROCYTE [DISTWIDTH] IN BLOOD BY AUTOMATED COUNT: 13.4 % (ref 11.5–14.5)
GLUCOSE SERPL-MCNC: 100 MG/DL (ref 74–99)
GLUCOSE UR STRIP.AUTO-MCNC: NORMAL MG/DL
HCT VFR BLD AUTO: 43.8 % (ref 36–46)
HGB BLD-MCNC: 14.4 G/DL (ref 12–16)
HOLD SPECIMEN: NORMAL
IMM GRANULOCYTES # BLD AUTO: 0.01 X10*3/UL (ref 0–0.7)
IMM GRANULOCYTES NFR BLD AUTO: 0.1 % (ref 0–0.9)
KETONES UR STRIP.AUTO-MCNC: NEGATIVE MG/DL
LEUKOCYTE ESTERASE UR QL STRIP.AUTO: NEGATIVE
LIPASE SERPL-CCNC: 20 U/L (ref 9–82)
LYMPHOCYTES # BLD AUTO: 2.05 X10*3/UL (ref 1.2–4.8)
LYMPHOCYTES NFR BLD AUTO: 26.6 %
MAGNESIUM SERPL-MCNC: 1.92 MG/DL (ref 1.6–2.4)
MCH RBC QN AUTO: 31.2 PG (ref 26–34)
MCHC RBC AUTO-ENTMCNC: 32.9 G/DL (ref 32–36)
MCV RBC AUTO: 95 FL (ref 80–100)
MONOCYTES # BLD AUTO: 0.66 X10*3/UL (ref 0.1–1)
MONOCYTES NFR BLD AUTO: 8.6 %
NEUTROPHILS # BLD AUTO: 4.87 X10*3/UL (ref 1.2–7.7)
NEUTROPHILS NFR BLD AUTO: 63.3 %
NITRITE UR QL STRIP.AUTO: NEGATIVE
NRBC BLD-RTO: 0 /100 WBCS (ref 0–0)
P AXIS: 47 DEGREES
P OFFSET: 199 MS
P ONSET: 163 MS
PH UR STRIP.AUTO: 7 [PH]
PLATELET # BLD AUTO: 236 X10*3/UL (ref 150–450)
POTASSIUM SERPL-SCNC: 4 MMOL/L (ref 3.5–5.3)
PR INTERVAL: 126 MS
PROT SERPL-MCNC: 7.3 G/DL (ref 6.4–8.2)
PROT UR STRIP.AUTO-MCNC: NEGATIVE MG/DL
Q ONSET: 226 MS
QRS COUNT: 12 BEATS
QRS DURATION: 78 MS
QT INTERVAL: 382 MS
QTC CALCULATION(BAZETT): 426 MS
QTC FREDERICIA: 411 MS
R AXIS: 44 DEGREES
RBC # BLD AUTO: 4.62 X10*6/UL (ref 4–5.2)
RBC # UR STRIP.AUTO: NEGATIVE MG/DL
SODIUM SERPL-SCNC: 139 MMOL/L (ref 136–145)
SP GR UR STRIP.AUTO: 1.01
T AXIS: 27 DEGREES
T OFFSET: 417 MS
UROBILINOGEN UR STRIP.AUTO-MCNC: NORMAL MG/DL
VENTRICULAR RATE: 75 BPM
WBC # BLD AUTO: 7.7 X10*3/UL (ref 4.4–11.3)

## 2025-05-27 PROCEDURE — 71045 X-RAY EXAM CHEST 1 VIEW: CPT | Performed by: STUDENT IN AN ORGANIZED HEALTH CARE EDUCATION/TRAINING PROGRAM

## 2025-05-27 PROCEDURE — 83735 ASSAY OF MAGNESIUM: CPT | Performed by: PHYSICIAN ASSISTANT

## 2025-05-27 PROCEDURE — 2550000001 HC RX 255 CONTRASTS: Performed by: EMERGENCY MEDICINE

## 2025-05-27 PROCEDURE — 84484 ASSAY OF TROPONIN QUANT: CPT | Performed by: PHYSICIAN ASSISTANT

## 2025-05-27 PROCEDURE — 4500999001 HC ED NO CHARGE

## 2025-05-27 PROCEDURE — 81003 URINALYSIS AUTO W/O SCOPE: CPT | Performed by: PHYSICIAN ASSISTANT

## 2025-05-27 PROCEDURE — 80053 COMPREHEN METABOLIC PANEL: CPT | Performed by: PHYSICIAN ASSISTANT

## 2025-05-27 PROCEDURE — 74177 CT ABD & PELVIS W/CONTRAST: CPT | Mod: FOREIGN READ | Performed by: RADIOLOGY

## 2025-05-27 PROCEDURE — 71045 X-RAY EXAM CHEST 1 VIEW: CPT

## 2025-05-27 PROCEDURE — 99285 EMERGENCY DEPT VISIT HI MDM: CPT | Mod: 25 | Performed by: EMERGENCY MEDICINE

## 2025-05-27 PROCEDURE — 83690 ASSAY OF LIPASE: CPT | Performed by: PHYSICIAN ASSISTANT

## 2025-05-27 PROCEDURE — 93005 ELECTROCARDIOGRAM TRACING: CPT

## 2025-05-27 PROCEDURE — 85025 COMPLETE CBC W/AUTO DIFF WBC: CPT | Performed by: PHYSICIAN ASSISTANT

## 2025-05-27 PROCEDURE — 36415 COLL VENOUS BLD VENIPUNCTURE: CPT | Performed by: PHYSICIAN ASSISTANT

## 2025-05-27 PROCEDURE — 99285 EMERGENCY DEPT VISIT HI MDM: CPT | Performed by: PHYSICIAN ASSISTANT

## 2025-05-27 PROCEDURE — 74177 CT ABD & PELVIS W/CONTRAST: CPT

## 2025-05-27 RX ORDER — ONDANSETRON HYDROCHLORIDE 2 MG/ML
4 INJECTION, SOLUTION INTRAVENOUS ONCE
Status: DISCONTINUED | OUTPATIENT
Start: 2025-05-27 | End: 2025-05-27 | Stop reason: HOSPADM

## 2025-05-27 RX ORDER — SUCRALFATE 1 G/10ML
1 SUSPENSION ORAL 4 TIMES DAILY
Qty: 560 ML | Refills: 0 | Status: SHIPPED | OUTPATIENT
Start: 2025-05-27 | End: 2025-06-10

## 2025-05-27 RX ORDER — PANTOPRAZOLE SODIUM 40 MG/1
40 TABLET, DELAYED RELEASE ORAL
Qty: 14 TABLET | Refills: 0 | Status: SHIPPED | OUTPATIENT
Start: 2025-05-27 | End: 2025-06-10

## 2025-05-27 RX ORDER — ONDANSETRON 4 MG/1
4 TABLET, FILM COATED ORAL EVERY 8 HOURS PRN
Qty: 9 TABLET | Refills: 0 | Status: SHIPPED | OUTPATIENT
Start: 2025-05-27 | End: 2025-05-30

## 2025-05-27 RX ADMIN — IOHEXOL 75 ML: 350 INJECTION, SOLUTION INTRAVENOUS at 13:02

## 2025-05-27 ASSESSMENT — COLUMBIA-SUICIDE SEVERITY RATING SCALE - C-SSRS
1. IN THE PAST MONTH, HAVE YOU WISHED YOU WERE DEAD OR WISHED YOU COULD GO TO SLEEP AND NOT WAKE UP?: NO
6. HAVE YOU EVER DONE ANYTHING, STARTED TO DO ANYTHING, OR PREPARED TO DO ANYTHING TO END YOUR LIFE?: NO
2. HAVE YOU ACTUALLY HAD ANY THOUGHTS OF KILLING YOURSELF?: NO

## 2025-05-27 ASSESSMENT — LIFESTYLE VARIABLES
EVER FELT BAD OR GUILTY ABOUT YOUR DRINKING: NO
TOTAL SCORE: 0
HAVE YOU EVER FELT YOU SHOULD CUT DOWN ON YOUR DRINKING: NO
HAVE PEOPLE ANNOYED YOU BY CRITICIZING YOUR DRINKING: NO
EVER HAD A DRINK FIRST THING IN THE MORNING TO STEADY YOUR NERVES TO GET RID OF A HANGOVER: NO

## 2025-05-27 ASSESSMENT — PAIN SCALES - GENERAL: PAINLEVEL_OUTOF10: 0 - NO PAIN

## 2025-05-27 ASSESSMENT — PAIN - FUNCTIONAL ASSESSMENT: PAIN_FUNCTIONAL_ASSESSMENT: 0-10

## 2025-05-27 NOTE — TELEPHONE ENCOUNTER
Patient left  relaying sx of nausea, chest pain.   Called patient and triaged. Spoke with SF regarding sx.  Called patient back and relayed for her to go the ED.   Patient stated she is going to [facility].  Relayed to tell them her sx and that our office sent her.  Patient verbally understood.

## 2025-05-27 NOTE — Clinical Note
Krystal Angulo was seen and treated in our emergency department on 5/27/2025.  She may return to work on 05/28/2025.       If you have any questions or concerns, please don't hesitate to call.      Betty Silverio PA-C

## 2025-05-27 NOTE — ED PROVIDER NOTES
Emergency Department Provider Note        History of Present Illness     History provided by: Patient  Limitations to History: None  External Records Reviewed with Brief Summary: None    HPI:  Krystal Angulo is a 64 y.o. female Who was sent in by her doctor due to concerns for upper abdominal pain, patient states has been having abdominal pain in her upper abdomen has been coming and going since Saturday, never fully goes away but waxes and wanes, she states that it was 8 out of 10 on Saturday and last night.  She has associated nausea and decreased appetite but no vomiting, she is also been having 2 diarrheal bowel movements since this started.  Denies seeing any blood in her diarrhea.  Patient does have associated chills without fevers.  She denies any urinary frequency burning urgency.  Patient does have associated chest tightness that she describes as being across her entire chest, it is not pleuritic, nonexertional, nonradiating, does seem to get worse and the abdominal pain gets worse.  She did have a soft BM this morning.  Denies any hemoptysis, leg pain or swelling, history of blood clots, malignancy, hormone use.  Denies recent surgeries or hospitalizations.        Physical Exam   Triage vitals:  T 36.8 °C (98.2 °F)  HR 87  /85  RR 15  O2 96 % None (Room air)    Physical Exam  Vitals and nursing note reviewed.   Constitutional:       General: She is not in acute distress.     Appearance: Normal appearance. She is not toxic-appearing.   HENT:      Head: Normocephalic and atraumatic.      Nose: Nose normal.   Eyes:      Extraocular Movements: Extraocular movements intact.   Cardiovascular:      Rate and Rhythm: Normal rate and regular rhythm.   Pulmonary:      Effort: Pulmonary effort is normal.      Breath sounds: Normal breath sounds.   Abdominal:      General: Bowel sounds are normal.      Palpations: Abdomen is soft.      Tenderness: There is no abdominal tenderness.    Musculoskeletal:         General: Normal range of motion.      Cervical back: Normal range of motion and neck supple.   Skin:     General: Skin is warm and dry.   Neurological:      General: No focal deficit present.      Mental Status: She is alert.   Psychiatric:         Mood and Affect: Mood normal.         Thought Content: Thought content normal.        XR chest 1 view    (Results Pending)     Labs Reviewed   CBC WITH AUTO DIFFERENTIAL       Result Value    WBC 7.7      nRBC 0.0      RBC 4.62      Hemoglobin 14.4      Hematocrit 43.8      MCV 95      MCH 31.2      MCHC 32.9      RDW 13.4      Platelets 236      Neutrophils % 63.3      Immature Granulocytes %, Automated 0.1      Lymphocytes % 26.6      Monocytes % 8.6      Eosinophils % 0.9      Basophils % 0.5      Neutrophils Absolute 4.87      Immature Granulocytes Absolute, Automated 0.01      Lymphocytes Absolute 2.05      Monocytes Absolute 0.66      Eosinophils Absolute 0.07      Basophils Absolute 0.04     MAGNESIUM   COMPREHENSIVE METABOLIC PANEL   LIPASE   URINALYSIS WITH REFLEX CULTURE AND MICROSCOPIC    Narrative:     The following orders were created for panel order Urinalysis with Reflex Culture and Microscopic.  Procedure                               Abnormality         Status                     ---------                               -----------         ------                     Urinalysis with Reflex C...[[id number]]                      In process                 Extra Urine Gray Tube[[id number]]                            In process                   Please view results for these tests on the individual orders.   TROPONIN I, HIGH SENSITIVITY   URINALYSIS WITH REFLEX CULTURE AND MICROSCOPIC   EXTRA URINE GRAY TUBE     ED Course as of 05/27/25 1912 Tue May 27, 2025   1110 ECG 12 lead (Clinic Performed)  ECG, obtained due to abdominal pain, per my read, with normal sinus rhythm, heart rate 75 bpm, normal axis and intervals, no T wave  inversions, no ST segment abnormalities. No change from prior ECG in . [MB]   1141 ECG 12 lead []      ED Course User Index  [MB] Megan Weinberg MD  [] Betty Silverio PA-C         Diagnoses as of 25   Acute gastritis without hemorrhage, unspecified gastritis type         Medical Decision Making & ED Course   Medical Decision Makin y.o. female Presents today for evaluation of upper abdominal pain nausea and diarrhea, see above HPI and physical exam, I did order labs including EKG, troponin, chest x-ray, my initial plan was going to be to order a CT however patient requested not to do the CT until some of her labs come back in after she sees the doctor and for this reason I held off on ordering initially.    CBC, CMP, urinalysis, magnesium lipase and troponin were unremarkable, EKG without acute ischemic changes, chest x-ray unremarkable, CT abdomen pelvis was ultimately obtained after patient saw ED attending and showed stomach wall thickening consistent with gastritis.  This fits patient's clinical symptoms of epigastric abdominal pain.  Will treat her with Protonix, Carafate and Zofran for home, she is able to tolerate p.o. without difficulty, will also give her GI follow-up.  Return precautions to the ED were discussed, patient stable for discharge at this time.  ----      Differential diagnoses considered include but are not limited to: SBO, gastroenteritis, infection, cardiac referred pain, pancreatitis, gallbladder pathology, etc.     Social Determinants of Health which Significantly Impact Care: None identified     EKG Independent Interpretation: EKG interpreted by myself. Please see ED Course for full interpretation.    Independent Result Review and Interpretation: Relevant laboratory and radiographic results were reviewed and independently interpreted by myself.  As necessary, they are commented on in the ED Course.    Chronic conditions affecting the patient's care: As  documented above in [facility]    The patient was discussed with the following consultants/services: None    Care Considerations: As documented above in [facility]    ED Course:  ED Course as of 05/27/25 1912   Tue May 27, 2025   1110 ECG 12 lead (Clinic Performed)  ECG, obtained due to abdominal pain, per my read, with normal sinus rhythm, heart rate 75 bpm, normal axis and intervals, no T wave inversions, no ST segment abnormalities. No change from prior ECG in 2022. [MB]   1141 ECG 12 lead []      ED Course User Index  [MB] Megan Weinberg MD  [] Betty Silverio PA-C         Diagnoses as of 05/27/25 1912   Acute gastritis without hemorrhage, unspecified gastritis type     Disposition   As a result of the work-up, the patient was discharged home.  she was informed of her diagnosis and instructed to come back with any concerns or worsening of condition.  she and was agreeable to the plan as discussed above.  she was given the opportunity to ask questions.  All of the patient's questions were answered.    Procedures   Procedures    This was a shared visit with an ED attending.  The patient was seen and discussed with the ED attending    Betty Silverio PA-C  Emergency Medicine       Betty Silverio PA-C  05/27/25 1913

## 2025-05-27 NOTE — DISCHARGE INSTRUCTIONS
Please return to the ER or seek immediate medical attention if you experience new or worsening abdominal pain, persistent vomiting, persistent diarrhea, black tar stools, fever of 38C (100.4) or higher, chest pain, shortness of breath, or worsening of your current symptoms.    You are welcome back any time. Thank you for entrusting your care to us, I hope we made your visit as pleasant as possible. Wishing you well!    Betty Silverio PA-C

## 2025-06-03 LAB
ATRIAL RATE: 75 BPM
P AXIS: 47 DEGREES
P OFFSET: 199 MS
P ONSET: 163 MS
PR INTERVAL: 126 MS
Q ONSET: 226 MS
QRS COUNT: 12 BEATS
QRS DURATION: 78 MS
QT INTERVAL: 382 MS
QTC CALCULATION(BAZETT): 426 MS
QTC FREDERICIA: 411 MS
R AXIS: 44 DEGREES
T AXIS: 27 DEGREES
T OFFSET: 417 MS
VENTRICULAR RATE: 75 BPM

## 2025-07-27 ENCOUNTER — HOSPITAL ENCOUNTER (EMERGENCY)
Facility: HOSPITAL | Age: 65
Discharge: HOME | End: 2025-07-28
Attending: STUDENT IN AN ORGANIZED HEALTH CARE EDUCATION/TRAINING PROGRAM
Payer: MEDICARE

## 2025-07-27 DIAGNOSIS — T30.0 BURN: Primary | ICD-10-CM

## 2025-07-27 PROCEDURE — 99284 EMERGENCY DEPT VISIT MOD MDM: CPT | Performed by: STUDENT IN AN ORGANIZED HEALTH CARE EDUCATION/TRAINING PROGRAM

## 2025-07-27 PROCEDURE — 96374 THER/PROPH/DIAG INJ IV PUSH: CPT | Performed by: STUDENT IN AN ORGANIZED HEALTH CARE EDUCATION/TRAINING PROGRAM

## 2025-07-27 PROCEDURE — 96361 HYDRATE IV INFUSION ADD-ON: CPT | Performed by: STUDENT IN AN ORGANIZED HEALTH CARE EDUCATION/TRAINING PROGRAM

## 2025-07-27 PROCEDURE — 2500000004 HC RX 250 GENERAL PHARMACY W/ HCPCS (ALT 636 FOR OP/ED)

## 2025-07-27 PROCEDURE — 16020 DRESS/DEBRID P-THICK BURN S: CPT | Performed by: STUDENT IN AN ORGANIZED HEALTH CARE EDUCATION/TRAINING PROGRAM

## 2025-07-27 RX ORDER — ONDANSETRON HYDROCHLORIDE 2 MG/ML
4 INJECTION, SOLUTION INTRAVENOUS ONCE
Status: COMPLETED | OUTPATIENT
Start: 2025-07-27 | End: 2025-07-28

## 2025-07-27 RX ORDER — SODIUM CHLORIDE, SODIUM LACTATE, POTASSIUM CHLORIDE, CALCIUM CHLORIDE 600; 310; 30; 20 MG/100ML; MG/100ML; MG/100ML; MG/100ML
105.45 INJECTION, SOLUTION INTRAVENOUS CONTINUOUS
Status: DISCONTINUED | OUTPATIENT
Start: 2025-07-27 | End: 2025-07-27

## 2025-07-27 RX ORDER — MORPHINE SULFATE 4 MG/ML
4 INJECTION, SOLUTION INTRAMUSCULAR; INTRAVENOUS ONCE
Status: COMPLETED | OUTPATIENT
Start: 2025-07-27 | End: 2025-07-27

## 2025-07-27 RX ADMIN — MORPHINE SULFATE 4 MG: 4 INJECTION, SOLUTION INTRAMUSCULAR; INTRAVENOUS at 23:39

## 2025-07-27 RX ADMIN — SODIUM CHLORIDE, SODIUM LACTATE, POTASSIUM CHLORIDE, AND CALCIUM CHLORIDE 1000 ML: .6; .31; .03; .02 INJECTION, SOLUTION INTRAVENOUS at 23:38

## 2025-07-27 ASSESSMENT — PAIN - FUNCTIONAL ASSESSMENT: PAIN_FUNCTIONAL_ASSESSMENT: 0-10

## 2025-07-27 ASSESSMENT — PAIN SCALES - GENERAL: PAINLEVEL_OUTOF10: 5 - MODERATE PAIN

## 2025-07-27 ASSESSMENT — PAIN DESCRIPTION - ORIENTATION: ORIENTATION: LEFT

## 2025-07-27 ASSESSMENT — PAIN DESCRIPTION - DESCRIPTORS: DESCRIPTORS: BURNING

## 2025-07-27 ASSESSMENT — PAIN DESCRIPTION - LOCATION: LOCATION: ARM

## 2025-07-28 VITALS
TEMPERATURE: 97.2 F | DIASTOLIC BLOOD PRESSURE: 85 MMHG | SYSTOLIC BLOOD PRESSURE: 153 MMHG | HEIGHT: 66 IN | BODY MASS INDEX: 24.91 KG/M2 | HEART RATE: 76 BPM | RESPIRATION RATE: 18 BRPM | OXYGEN SATURATION: 84 % | WEIGHT: 155 LBS

## 2025-07-28 PROCEDURE — 96375 TX/PRO/DX INJ NEW DRUG ADDON: CPT | Performed by: STUDENT IN AN ORGANIZED HEALTH CARE EDUCATION/TRAINING PROGRAM

## 2025-07-28 PROCEDURE — 2500000004 HC RX 250 GENERAL PHARMACY W/ HCPCS (ALT 636 FOR OP/ED)

## 2025-07-28 RX ORDER — SILVER SULFADIAZINE 10 G/1000G
1 CREAM TOPICAL DAILY
Qty: 1 G | Refills: 0 | Status: SHIPPED | OUTPATIENT
Start: 2025-07-28 | End: 2025-08-07

## 2025-07-28 RX ORDER — OXYCODONE HYDROCHLORIDE 5 MG/1
5 TABLET ORAL EVERY 6 HOURS PRN
Qty: 12 TABLET | Refills: 0 | Status: SHIPPED | OUTPATIENT
Start: 2025-07-28 | End: 2025-07-31

## 2025-07-28 RX ADMIN — ONDANSETRON 4 MG: 2 INJECTION, SOLUTION INTRAMUSCULAR; INTRAVENOUS at 00:06
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
## 2026-02-12 ENCOUNTER — APPOINTMENT (OUTPATIENT)
Dept: PRIMARY CARE | Facility: CLINIC | Age: 66
End: 2026-02-12
Payer: MEDICARE